# Patient Record
Sex: FEMALE | Race: WHITE | Employment: OTHER | URBAN - METROPOLITAN AREA
[De-identification: names, ages, dates, MRNs, and addresses within clinical notes are randomized per-mention and may not be internally consistent; named-entity substitution may affect disease eponyms.]

---

## 2017-04-08 ENCOUNTER — HOSPITAL ENCOUNTER (OUTPATIENT)
Dept: MRI IMAGING | Age: 62
Discharge: HOME | End: 2017-04-08
Payer: MEDICARE

## 2017-04-08 VITALS — WEIGHT: 254 LBS

## 2017-04-08 DIAGNOSIS — M48.02 CERVICAL SPINAL STENOSIS: ICD-10-CM

## 2017-04-08 PROCEDURE — 72141 MRI NECK SPINE W/O DYE: CPT

## 2017-10-23 ENCOUNTER — HOSPITAL ENCOUNTER (OUTPATIENT)
Dept: PREADMISSION TESTING | Age: 62
Discharge: HOME | End: 2017-10-23
Payer: MEDICARE

## 2017-10-23 VITALS
RESPIRATION RATE: 16 BRPM | SYSTOLIC BLOOD PRESSURE: 194 MMHG | HEART RATE: 86 BPM | WEIGHT: 258.13 LBS | OXYGEN SATURATION: 95 % | BODY MASS INDEX: 41.49 KG/M2 | DIASTOLIC BLOOD PRESSURE: 60 MMHG | HEIGHT: 66 IN | TEMPERATURE: 98 F

## 2017-10-23 DIAGNOSIS — R01.1 HEART MURMUR: Chronic | ICD-10-CM

## 2017-10-23 DIAGNOSIS — K44.9 HIATAL HERNIA: ICD-10-CM

## 2017-10-23 DIAGNOSIS — N28.9 KIDNEY DISEASE: Chronic | ICD-10-CM

## 2017-10-23 PROBLEM — I25.10 CORONARY ARTERY DISEASE INVOLVING NATIVE HEART: Status: ACTIVE | Noted: 2017-08-09

## 2017-10-23 PROBLEM — M54.50 LOW BACK PAIN: Status: ACTIVE | Noted: 2017-10-23

## 2017-10-23 PROBLEM — N39.46 MIXED STRESS AND URGE URINARY INCONTINENCE: Status: ACTIVE | Noted: 2017-10-17

## 2017-10-23 PROBLEM — D50.9 IRON DEFICIENCY ANEMIA: Status: ACTIVE | Noted: 2017-06-23

## 2017-10-23 PROBLEM — Z78.9 STATIN INTOLERANCE: Status: ACTIVE | Noted: 2017-10-23

## 2017-10-23 PROBLEM — N32.81 OAB (OVERACTIVE BLADDER): Status: ACTIVE | Noted: 2017-10-17

## 2017-10-23 LAB
ANION GAP SERPL CALCULATED.3IONS-SCNC: 16 MEQ/L (ref 7–13)
BUN BLDV-MCNC: 37 MG/DL (ref 8–23)
CALCIUM SERPL-MCNC: 8.9 MG/DL (ref 8.6–10.2)
CHLORIDE BLD-SCNC: 99 MEQ/L (ref 98–107)
CO2: 22 MEQ/L (ref 22–29)
CREAT SERPL-MCNC: 1.16 MG/DL (ref 0.5–0.9)
EKG ATRIAL RATE: 73 BPM
EKG P AXIS: 43 DEGREES
EKG P-R INTERVAL: 180 MS
EKG Q-T INTERVAL: 390 MS
EKG QRS DURATION: 86 MS
EKG QTC CALCULATION (BAZETT): 429 MS
EKG R AXIS: 5 DEGREES
EKG T AXIS: 43 DEGREES
EKG VENTRICULAR RATE: 73 BPM
GFR AFRICAN AMERICAN: 56.9
GFR NON-AFRICAN AMERICAN: 47
GLUCOSE BLD-MCNC: 252 MG/DL (ref 74–109)
HCT VFR BLD CALC: 30.7 % (ref 37–47)
HEMOGLOBIN: 10 G/DL (ref 12–16)
MCH RBC QN AUTO: 26.5 PG (ref 27–31.3)
MCHC RBC AUTO-ENTMCNC: 32.5 % (ref 33–37)
MCV RBC AUTO: 81.5 FL (ref 82–100)
PDW BLD-RTO: 16.6 % (ref 11.5–14.5)
PLATELET # BLD: 239 K/UL (ref 130–400)
POTASSIUM SERPL-SCNC: 5.2 MEQ/L (ref 3.5–5.1)
RBC # BLD: 3.77 M/UL (ref 4.2–5.4)
SODIUM BLD-SCNC: 137 MEQ/L (ref 132–144)
WBC # BLD: 9.6 K/UL (ref 4.8–10.8)

## 2017-10-23 PROCEDURE — 85027 COMPLETE CBC AUTOMATED: CPT

## 2017-10-23 PROCEDURE — 80048 BASIC METABOLIC PNL TOTAL CA: CPT

## 2017-10-23 PROCEDURE — 93010 ELECTROCARDIOGRAM REPORT: CPT | Performed by: INTERNAL MEDICINE

## 2017-10-23 PROCEDURE — 93005 ELECTROCARDIOGRAM TRACING: CPT

## 2017-10-23 RX ORDER — DULOXETIN HYDROCHLORIDE 60 MG/1
60 CAPSULE, DELAYED RELEASE ORAL DAILY
COMMUNITY
Start: 2017-09-01

## 2017-10-23 RX ORDER — CHLORTHALIDONE 25 MG/1
25 TABLET ORAL
COMMUNITY
Start: 2017-07-26

## 2017-10-23 RX ORDER — MONTELUKAST SODIUM 10 MG/1
TABLET ORAL
COMMUNITY
Start: 2017-10-04

## 2017-10-23 RX ORDER — IRBESARTAN 300 MG/1
150 TABLET ORAL 2 TIMES DAILY
COMMUNITY
Start: 2017-07-27

## 2017-10-23 RX ORDER — ALBUTEROL SULFATE 90 UG/1
2 AEROSOL, METERED RESPIRATORY (INHALATION) EVERY 6 HOURS PRN
COMMUNITY

## 2017-10-23 RX ORDER — HYDROMORPHONE HYDROCHLORIDE 4 MG/1
4 TABLET ORAL
COMMUNITY
End: 2021-04-23

## 2017-10-23 RX ORDER — LEVOTHYROXINE SODIUM 88 UG/1
88 TABLET ORAL DAILY
COMMUNITY
Start: 2013-09-12

## 2017-10-23 RX ORDER — NEBIVOLOL 20 MG/1
20 TABLET ORAL
COMMUNITY
Start: 2017-07-26 | End: 2021-11-01

## 2017-10-23 RX ORDER — DULOXETIN HYDROCHLORIDE 20 MG/1
20 CAPSULE, DELAYED RELEASE ORAL
COMMUNITY
Start: 2017-09-01 | End: 2021-04-23

## 2017-10-23 RX ORDER — DOXAZOSIN MESYLATE 1 MG/1
2 TABLET ORAL
COMMUNITY
Start: 2017-03-24 | End: 2020-10-23 | Stop reason: DRUGHIGH

## 2017-10-23 RX ORDER — CODEINE/BUTALBITAL/ASA/CAFFEIN 30-50-325
1 CAPSULE ORAL
COMMUNITY
End: 2021-11-01

## 2017-10-23 RX ORDER — CARISOPRODOL 350 MG/1
350 TABLET ORAL
COMMUNITY
End: 2021-11-01 | Stop reason: ALTCHOICE

## 2017-10-23 RX ORDER — NITROGLYCERIN 0.3 MG/1
TABLET SUBLINGUAL
COMMUNITY
Start: 2017-02-24

## 2017-10-27 ENCOUNTER — ANESTHESIA EVENT (OUTPATIENT)
Dept: OPERATING ROOM | Age: 62
End: 2017-10-27
Payer: MEDICARE

## 2017-10-27 RX ORDER — SODIUM CHLORIDE 0.9 % (FLUSH) 0.9 %
10 SYRINGE (ML) INJECTION EVERY 12 HOURS SCHEDULED
Status: CANCELLED | OUTPATIENT
Start: 2017-10-27

## 2017-10-27 RX ORDER — SODIUM CHLORIDE 0.9 % (FLUSH) 0.9 %
10 SYRINGE (ML) INJECTION PRN
Status: CANCELLED | OUTPATIENT
Start: 2017-10-27

## 2017-10-30 ENCOUNTER — APPOINTMENT (OUTPATIENT)
Dept: GENERAL RADIOLOGY | Age: 62
End: 2017-10-30
Attending: SPECIALIST
Payer: MEDICARE

## 2017-10-30 ENCOUNTER — ANESTHESIA (OUTPATIENT)
Dept: OPERATING ROOM | Age: 62
End: 2017-10-30
Payer: MEDICARE

## 2017-10-30 ENCOUNTER — HOSPITAL ENCOUNTER (OUTPATIENT)
Age: 62
Setting detail: OUTPATIENT SURGERY
Discharge: HOME | End: 2017-10-30
Attending: SPECIALIST | Admitting: SPECIALIST
Payer: MEDICARE

## 2017-10-30 VITALS — SYSTOLIC BLOOD PRESSURE: 166 MMHG | TEMPERATURE: 96.8 F | OXYGEN SATURATION: 100 % | DIASTOLIC BLOOD PRESSURE: 70 MMHG

## 2017-10-30 VITALS
SYSTOLIC BLOOD PRESSURE: 164 MMHG | OXYGEN SATURATION: 95 % | RESPIRATION RATE: 16 BRPM | HEART RATE: 59 BPM | DIASTOLIC BLOOD PRESSURE: 70 MMHG | TEMPERATURE: 97.6 F

## 2017-10-30 LAB
GLUCOSE BLD-MCNC: 135 MG/DL (ref 60–115)
GLUCOSE BLD-MCNC: 163 MG/DL (ref 60–115)
PERFORMED ON: ABNORMAL
PERFORMED ON: ABNORMAL

## 2017-10-30 PROCEDURE — 6360000002 HC RX W HCPCS: Performed by: SPECIALIST

## 2017-10-30 PROCEDURE — 7100000001 HC PACU RECOVERY - ADDTL 15 MIN: Performed by: SPECIALIST

## 2017-10-30 PROCEDURE — A6258 TRANSPARENT FILM >16<=48 IN: HCPCS | Performed by: SPECIALIST

## 2017-10-30 PROCEDURE — 7100000000 HC PACU RECOVERY - FIRST 15 MIN: Performed by: SPECIALIST

## 2017-10-30 PROCEDURE — 3600000014 HC SURGERY LEVEL 4 ADDTL 15MIN: Performed by: SPECIALIST

## 2017-10-30 PROCEDURE — 2580000003 HC RX 258: Performed by: STUDENT IN AN ORGANIZED HEALTH CARE EDUCATION/TRAINING PROGRAM

## 2017-10-30 PROCEDURE — C1767 GENERATOR, NEURO NON-RECHARG: HCPCS | Performed by: SPECIALIST

## 2017-10-30 PROCEDURE — 3700000001 HC ADD 15 MINUTES (ANESTHESIA): Performed by: SPECIALIST

## 2017-10-30 PROCEDURE — 2580000003 HC RX 258: Performed by: SPECIALIST

## 2017-10-30 PROCEDURE — C1778 LEAD, NEUROSTIMULATOR: HCPCS | Performed by: SPECIALIST

## 2017-10-30 PROCEDURE — 3600000004 HC SURGERY LEVEL 4 BASE: Performed by: SPECIALIST

## 2017-10-30 PROCEDURE — 3700000000 HC ANESTHESIA ATTENDED CARE: Performed by: SPECIALIST

## 2017-10-30 PROCEDURE — 7100000010 HC PHASE II RECOVERY - FIRST 15 MIN: Performed by: SPECIALIST

## 2017-10-30 PROCEDURE — 2500000003 HC RX 250 WO HCPCS: Performed by: SPECIALIST

## 2017-10-30 PROCEDURE — 3209999900 FLUORO FOR SURGICAL PROCEDURES

## 2017-10-30 PROCEDURE — 6360000002 HC RX W HCPCS: Performed by: NURSE ANESTHETIST, CERTIFIED REGISTERED

## 2017-10-30 PROCEDURE — 7100000011 HC PHASE II RECOVERY - ADDTL 15 MIN: Performed by: SPECIALIST

## 2017-10-30 PROCEDURE — 2500000003 HC RX 250 WO HCPCS: Performed by: STUDENT IN AN ORGANIZED HEALTH CARE EDUCATION/TRAINING PROGRAM

## 2017-10-30 PROCEDURE — C1713 ANCHOR/SCREW BN/BN,TIS/BN: HCPCS | Performed by: SPECIALIST

## 2017-10-30 DEVICE — IMPLANTABLE DEVICE: Type: IMPLANTABLE DEVICE | Status: FUNCTIONAL

## 2017-10-30 DEVICE — GENERATOR NEUROSTIMULATOR 304CUCM H219XL195IN THK053IN: Type: IMPLANTABLE DEVICE | Status: FUNCTIONAL

## 2017-10-30 DEVICE — ANCHOR SUT FOR SPNL CRD STIM SWIFT LOCK: Type: IMPLANTABLE DEVICE | Status: FUNCTIONAL

## 2017-10-30 DEVICE — Z DUP USE 2355936 LEAD NEUROSTIMULATOR L60CM 8 ELECTRD KT OCTRODE: Type: IMPLANTABLE DEVICE | Status: FUNCTIONAL

## 2017-10-30 RX ORDER — SODIUM CHLORIDE, SODIUM LACTATE, POTASSIUM CHLORIDE, CALCIUM CHLORIDE 600; 310; 30; 20 MG/100ML; MG/100ML; MG/100ML; MG/100ML
INJECTION, SOLUTION INTRAVENOUS CONTINUOUS
Status: DISCONTINUED | OUTPATIENT
Start: 2017-10-30 | End: 2017-10-30 | Stop reason: HOSPADM

## 2017-10-30 RX ORDER — MEPERIDINE HYDROCHLORIDE 25 MG/ML
12.5 INJECTION INTRAMUSCULAR; INTRAVENOUS; SUBCUTANEOUS EVERY 5 MIN PRN
Status: DISCONTINUED | OUTPATIENT
Start: 2017-10-30 | End: 2017-10-30 | Stop reason: HOSPADM

## 2017-10-30 RX ORDER — SODIUM CHLORIDE 0.9 % (FLUSH) 0.9 %
10 SYRINGE (ML) INJECTION EVERY 12 HOURS SCHEDULED
Status: DISCONTINUED | OUTPATIENT
Start: 2017-10-30 | End: 2017-10-30 | Stop reason: HOSPADM

## 2017-10-30 RX ORDER — METOCLOPRAMIDE HYDROCHLORIDE 5 MG/ML
10 INJECTION INTRAMUSCULAR; INTRAVENOUS
Status: DISCONTINUED | OUTPATIENT
Start: 2017-10-30 | End: 2017-10-30 | Stop reason: HOSPADM

## 2017-10-30 RX ORDER — PROPOFOL 10 MG/ML
INJECTION, EMULSION INTRAVENOUS PRN
Status: DISCONTINUED | OUTPATIENT
Start: 2017-10-30 | End: 2017-10-30 | Stop reason: SDUPTHER

## 2017-10-30 RX ORDER — DIPHENHYDRAMINE HYDROCHLORIDE 50 MG/ML
12.5 INJECTION INTRAMUSCULAR; INTRAVENOUS
Status: DISCONTINUED | OUTPATIENT
Start: 2017-10-30 | End: 2017-10-30 | Stop reason: HOSPADM

## 2017-10-30 RX ORDER — ONDANSETRON 2 MG/ML
4 INJECTION INTRAMUSCULAR; INTRAVENOUS
Status: DISCONTINUED | OUTPATIENT
Start: 2017-10-30 | End: 2017-10-30 | Stop reason: HOSPADM

## 2017-10-30 RX ORDER — HYDROCODONE BITARTRATE AND ACETAMINOPHEN 5; 325 MG/1; MG/1
1 TABLET ORAL PRN
Status: DISCONTINUED | OUTPATIENT
Start: 2017-10-30 | End: 2017-10-30 | Stop reason: HOSPADM

## 2017-10-30 RX ORDER — FENTANYL CITRATE 50 UG/ML
50 INJECTION, SOLUTION INTRAMUSCULAR; INTRAVENOUS EVERY 10 MIN PRN
Status: DISCONTINUED | OUTPATIENT
Start: 2017-10-30 | End: 2017-10-30 | Stop reason: HOSPADM

## 2017-10-30 RX ORDER — PROPOFOL 10 MG/ML
INJECTION, EMULSION INTRAVENOUS CONTINUOUS PRN
Status: DISCONTINUED | OUTPATIENT
Start: 2017-10-30 | End: 2017-10-30 | Stop reason: SDUPTHER

## 2017-10-30 RX ORDER — MAGNESIUM HYDROXIDE 1200 MG/15ML
LIQUID ORAL CONTINUOUS PRN
Status: DISCONTINUED | OUTPATIENT
Start: 2017-10-30 | End: 2017-10-30 | Stop reason: HOSPADM

## 2017-10-30 RX ORDER — SODIUM CHLORIDE 0.9 % (FLUSH) 0.9 %
10 SYRINGE (ML) INJECTION PRN
Status: DISCONTINUED | OUTPATIENT
Start: 2017-10-30 | End: 2017-10-30 | Stop reason: HOSPADM

## 2017-10-30 RX ORDER — ONDANSETRON 2 MG/ML
4 INJECTION INTRAMUSCULAR; INTRAVENOUS EVERY 6 HOURS PRN
Status: DISCONTINUED | OUTPATIENT
Start: 2017-10-30 | End: 2017-10-30 | Stop reason: HOSPADM

## 2017-10-30 RX ORDER — BUPIVACAINE HYDROCHLORIDE 5 MG/ML
INJECTION, SOLUTION EPIDURAL; INTRACAUDAL PRN
Status: DISCONTINUED | OUTPATIENT
Start: 2017-10-30 | End: 2017-10-30 | Stop reason: HOSPADM

## 2017-10-30 RX ORDER — LIDOCAINE HYDROCHLORIDE 10 MG/ML
1 INJECTION, SOLUTION EPIDURAL; INFILTRATION; INTRACAUDAL; PERINEURAL
Status: COMPLETED | OUTPATIENT
Start: 2017-10-30 | End: 2017-10-30

## 2017-10-30 RX ORDER — HYDROCODONE BITARTRATE AND ACETAMINOPHEN 5; 325 MG/1; MG/1
2 TABLET ORAL PRN
Status: DISCONTINUED | OUTPATIENT
Start: 2017-10-30 | End: 2017-10-30 | Stop reason: HOSPADM

## 2017-10-30 RX ORDER — LIDOCAINE HYDROCHLORIDE AND EPINEPHRINE 10; 10 MG/ML; UG/ML
INJECTION, SOLUTION INFILTRATION; PERINEURAL PRN
Status: DISCONTINUED | OUTPATIENT
Start: 2017-10-30 | End: 2017-10-30 | Stop reason: HOSPADM

## 2017-10-30 RX ORDER — LIDOCAINE HYDROCHLORIDE 10 MG/ML
1 INJECTION, SOLUTION EPIDURAL; INFILTRATION; INTRACAUDAL; PERINEURAL
Status: DISCONTINUED | OUTPATIENT
Start: 2017-10-30 | End: 2017-10-30 | Stop reason: HOSPADM

## 2017-10-30 RX ADMIN — PROPOFOL 30 MG: 10 INJECTION, EMULSION INTRAVENOUS at 08:42

## 2017-10-30 RX ADMIN — LIDOCAINE HYDROCHLORIDE 0.1 ML: 10 INJECTION, SOLUTION EPIDURAL; INFILTRATION; INTRACAUDAL; PERINEURAL at 06:15

## 2017-10-30 RX ADMIN — PROPOFOL 10 MG: 10 INJECTION, EMULSION INTRAVENOUS at 09:38

## 2017-10-30 RX ADMIN — PROPOFOL 30 MG: 10 INJECTION, EMULSION INTRAVENOUS at 08:04

## 2017-10-30 RX ADMIN — PROPOFOL 20 MG: 10 INJECTION, EMULSION INTRAVENOUS at 08:15

## 2017-10-30 RX ADMIN — PROPOFOL 140 MCG/KG/MIN: 10 INJECTION, EMULSION INTRAVENOUS at 08:03

## 2017-10-30 RX ADMIN — VANCOMYCIN HYDROCHLORIDE 1000 MG: 1 INJECTION, POWDER, LYOPHILIZED, FOR SOLUTION INTRAVENOUS at 06:15

## 2017-10-30 RX ADMIN — SODIUM CHLORIDE, POTASSIUM CHLORIDE, SODIUM LACTATE AND CALCIUM CHLORIDE: 600; 310; 30; 20 INJECTION, SOLUTION INTRAVENOUS at 08:54

## 2017-10-30 RX ADMIN — PROPOFOL 10 MG: 10 INJECTION, EMULSION INTRAVENOUS at 09:37

## 2017-10-30 RX ADMIN — SODIUM CHLORIDE, POTASSIUM CHLORIDE, SODIUM LACTATE AND CALCIUM CHLORIDE: 600; 310; 30; 20 INJECTION, SOLUTION INTRAVENOUS at 06:15

## 2017-10-30 RX ADMIN — PROPOFOL 30 MG: 10 INJECTION, EMULSION INTRAVENOUS at 08:03

## 2017-10-30 ASSESSMENT — PAIN - FUNCTIONAL ASSESSMENT: PAIN_FUNCTIONAL_ASSESSMENT: 0-10

## 2017-10-30 NOTE — BRIEF OP NOTE
Brief Postoperative Note  ______________________________________________________________    Patient: Megan Best  YOB: 1955  MRN: [id number]  Date of Procedure: 10/30/2017    Pre-Op Diagnosis: SPINAL STENOSIS THORACIC RADICULOPATHY    Post-Op Diagnosis: Same       Procedure(s):  SPINAL CORD STIMULATOR IMPLANT (ST OTIS)    Anesthesia: Monitor Anesthesia Care    Surgeon(s):  Kylee Hall MD    Staff:  First Assistant: Geri Hearing  Scrub Person First: Tiarra Mendoza     Estimated Blood Loss: 25 (units unknown)    Complications: None    Specimens:   * No specimens in log *    Implants:    Implant Name Type Inv.  Item Serial No.  Lot No. LRB No. Used   LEAD OCTRODE KT 60CM - [id number] Spine LEAD OCTRODE KT 60CM [id number] [address] [place] [address]  N/A 1   LEAD OCTRODE KT 90CM - [id number] Spine LEAD OCTRODE KT 90CM [id number] [address] [place] [address]  N/A 1   [employer] lock   [id number]  [id number] N/A 2   IMPL SPINE STIMULATOR PROCLAIM 5 [employer]ITE - [id number] Spine:Stimulator IMPL SPINE STIMULATOR PROCLAIM 5 [employer] [id number] [address] [place] [address]   N/A 1         Drains:  none    Findings: see op note    Kylee Hall MD  Date: 10/30/2017  Time: 9:50 AM

## 2017-10-30 NOTE — OP NOTE
[address]                     [address], [address] [facility]                               OPERATIVE REPORT    PATIENT NAME: Sam Hawkins                  :             1955  MED REC NO:   [id number]                           ROOM:  ACCOUNT NO:   [de-identified]                          ADMISSION DATE:  10/30/2017  PROVIDER:     Gaston Abel MD    DATE OF PROCEDURE:  10/30/2017    PROCEDURE:  Implantation of dual spinal cord stimulator lead with a  generator, non-chargeable, reprogrammable, company [employer]. PREOPERATIVE DIAGNOSES:  Status post laminectomy syndrome, lumbar  radiculopathy. POSTOPERATIVE DIAGNOSES:  Status post laminectomy syndrome, lumbar  radiculopathy. ANESTHESIA:  MAC. SURGEON:  Juan Luis Pickering MD.    OPERATIVE PROCEDURE:  The patient was brought to the operating room. She was in prone position. All the pressure points were properly  secured. Anesthesia was started. Back was sterilely cleaned and  draped with aseptic technique. With fluoroscopy, AP and lateral view  of lumbar spine was obtained and decision was made to enter L1-L2  epidural space. Skin was made numb to the left of midline at about  L2-L3 level with 1% lidocaine containing 1:200,000 epinephrine with  0.5% Marcaine mixed in equal amount. Then, Tuohy needle that was  provided in the kit was then brought to the field, was inserted, and  was approached and advanced under fluoroscopic guidance to reach L1-L2  epidural space. This was to the left of midline. Having the needle  in proper position, stylet was taken out, lead was inserted and was  threaded to reach the top of T8 level. In a similar way, the second  needle was inserted to the right of midline and the stylet was taken  out. Lead was inserted and was advanced to reach the top of T level  to the right of midline.   Now, another local anesthetic was injected  in the midline, incision was made, and dissection was done to reach  the supraspinous fascia. Both needles were found. All the bleeding  points were cauterized. Then the patient was awakened, stimulation  was carried out. The patient had excellent vibration in all the area  of the _____. Therefore, it was thought satisfactory and both leads  were then secured with the anchor that was provided by Janelle Rogers and  they were locked and were secured to supraspinous fascia with 2-0  silk. Our attention was given to the left of midline over the hip  area where local anesthetic was injected. An incision was made and  dissection was done to create a pocket. All the bleeding points were  cauterized. Now, tunnel was created from pocket through the back and  both leads were threaded through. One lead was 60 cm long and the  right of midline was 90 cm long. We left lot of curves at the back  area and then brought both leads to the pocket, then it was connected  to the battery and screws were tightened. All the impedances were  checked, they were within normal limits. Battery was put back in the  pocket, secured to the fascia with 2-0 silk. All the wounds were  closed with 3-0 Vicryl, 4-0 Vicryl. Because she is allergic to a lot  of things, simple dressings were applied. The patient was awakened,  taken to the recovery room, extensive programming was done, and was  then discharged in satisfactory condition.         June Hawkins MD    D: 10/30/2017 10:12:37       T: 10/30/2017 13:05:38     [email or web address]  Job#: [id number]     Doc#: [id number]

## 2017-10-30 NOTE — PROGRESS NOTES
Ready for disch. Instr have been reviewed. Patient and son verbalize understanding. [employer] rep to talk with patient prior to disch. Called into OR; unable to come to bedside at this time. Patient and son updated.

## 2017-10-30 NOTE — ANESTHESIA PRE PROCEDURE
Department of Anesthesiology  Preprocedure Note       Name:  Guilherme Records   Age:  57 y.o.  :  1955                                          MRN:  [id number]         Date:  10/30/2017      Surgeon: Selina Ceballos):  Yoni Childs MD    Procedure: Procedure(s):  SPINAL CORD STIMULATOR IMPLANT (James Haile)    Medications prior to admission:   Prior to Admission medications    Medication Sig Start Date End Date Taking? Authorizing Provider   carisoprodol (SOMA) 350 MG tablet Take 350 mg by mouth   Yes Historical Provider, MD   nebivolol (BYSTOLIC) 20 MG TABS tablet Take 20 mg by mouth 17  Yes Historical Provider, MD   montelukast (SINGULAIR) 10 MG tablet TAKE 1 TABLET BY MOUTH DAILY AT BEDTIME 10/4/17  Yes Historical Provider, MD   linagliptin (TRADJENTA) 5 MG tablet Take by mouth   Yes Historical Provider, MD   levothyroxine (SYNTHROID) 88 MCG tablet Take 88 mcg by mouth 13  Yes Historical Provider, MD   irbesartan (AVAPRO) 300 MG tablet Take 150 mg by mouth 17  Yes Historical Provider, MD   DULoxetine (CYMBALTA) 20 MG extended release capsule Take 20 mg by mouth 17  Yes Historical Provider, MD   DULoxetine (CYMBALTA) 60 MG extended release capsule Take 60 mg by mouth 17  Yes Historical Provider, MD   HYDROmorphone (EXALGO) 8 MG T24A extended release tablet Take 8 mg by mouth . Yes Historical Provider, MD   insulin regular human (HUMULIN R U-500) 500 UNIT/ML concentrated injection vial 24 units in am, 21 units in pm 17  Yes Historical Provider, MD   doxazosin (CARDURA) 1 MG tablet Take 2 mg by mouth 3/24/17  Yes Historical Provider, MD   HYDROmorphone (DILAUDID) 4 MG tablet Take 4 mg by mouth .    Yes Historical Provider, MD   chlorthalidone (HYGROTON) 25 MG tablet Take 25 mg by mouth 17  Yes Historical Provider, MD   albuterol sulfate  (90 Base) MCG/ACT inhaler Inhale 2 puffs into the lungs every 6 hours as needed for Wheezing   Yes Historical Provider, MD butalbital-aspirin-caffeine-codeine (FIORINAL WITH CODEINE) -47-30 MG capsule Take 1 capsule by mouth    Historical Provider, MD   nitroGLYCERIN (NITROSTAT) 0.3 MG SL tablet DISOLVE 1 TABLET UNDER THE TONGUE AS NEEDED FOR CHEST PAIN. IF NO REFLIEF CALL 911 2/24/17   Historical Provider, MD   Evolocumab 140 MG/ML SOAJ Inject 140 mg into the skin 10/6/17   Historical Provider, MD   diclofenac sodium 1 % GEL apply 2gms to areas of joint pain up to four times a day. Avoid contact with eyes.  Do not exceed 32gm/day 2/23/16   Historical Provider, MD       Current medications:    Current Facility-Administered Medications   Medication Dose Route Frequency Provider Last Rate Last Dose    lactated ringers infusion   Intravenous Continuous Ova Pollen Lin,  mL/hr at 10/30/17 0615      vancomycin 1000 mg IVPB in 250 mL D5W addavial  1,000 mg Intravenous Once Yoni Childs  mL/hr at 10/30/17 0615 1,000 mg at 10/30/17 0615    fentaNYL (SUBLIMAZE) injection 50 mcg  50 mcg Intravenous Q10 Min PRN Deysi Flores MD        HYDROmorphone (DILAUDID) injection 0.5 mg  0.5 mg Intravenous Q10 Min PRN Deysi Flores MD        HYDROcodone-acetaminophen [facility]) 5-325 MG per tablet 1 tablet  1 tablet Oral PRN Deysi Flores MD        Or    HYDROcodone-acetaminophen [facility]) 5-325 MG per tablet 2 tablet  2 tablet Oral PRN Deysi Flores MD        diphenhydrAMINE (BENADRYL) injection 12.5 mg  12.5 mg Intravenous Once PRN Deysi Flores MD        ondansetron [facility]) injection 4 mg  4 mg Intravenous Once PRN Deysi Flores MD        metoclopramide [facility]) injection 10 mg  10 mg Intravenous Once PRN Deysi Flores MD        meperidine (DEMEROL) injection 12.5 mg  12.5 mg Intravenous Q5 Min PRN Deysi Flores MD        lactated ringers infusion   Intravenous Continuous Deysi Flores MD        sodium chloride flush 0.9 % injection 10 mL  10 stenosis M48.061    Major depressive disorder, single episode, mild (HCC) F32.0    Mixed hyperlipidemia E78.2    Mixed incontinence urge and stress (male)(female) N39.46    Mixed stress and urge urinary incontinence N39.46    Neck mass R22.1    OAB (overactive bladder) N32.81    Pain in joint, multiple sites M25.50    Renal artery stenosis (HCC) I70.1    Renovascular hypertension I15.0    Statin intolerance Z78.9    Subclavian arterial stenosis ([facility]) I77.1    Type 2 diabetes mellitus with moderate nonproliferative diabetic retinopathy and without macular edema ([facility]) D08.8861    Type 2 diabetes mellitus with diabetic neuropathy ([facility]) E11.40    Type 2 diabetes mellitus with moderate nonproliferative diabetic retinopathy with macular edema E11.3319    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    Vitamin D deficiency E55.9    Low back pain M54.5    Heart murmur R01.1    Hiatal hernia K44.9    Kidney disease N28.9       Past Medical History:        Diagnosis Date    Diabetes mellitus ([facility] [address]     hx since 10/1992    Hiatal hernia     Hyperlipidemia     past meds / now on Repatha    Hypertension     meds > 74 yrs / denies TIA or stroke    Inflammatory arthritis 11/20/2012    AMANDEEP (obstructive sleep apnea)     DOES NOT USE CPAP    PVD (peripheral vascular disease) ([facility])     2 stents left thigh    Renal artery occlusion ([facility])     stents of right    Renal insufficiency     Thyroid disease     meds > 4 yrs       Past Surgical History:        Procedure Laterality Date    BACK SURGERY      C 3-4 fusion    BACK SURGERY      L 3-4-5 laminectomy    CARPAL TUNNEL RELEASE Bilateral     CERVICAL SPINE SURGERY      CHOLECYSTECTOMY      COLONOSCOPY      CYSTOCELE REPAIR      ENDOSCOPY, COLON, DIAGNOSTIC      FEMORAL BYPASS       left thigh stent     HYSTERECTOMY      KIDNEY SURGERY      renal stent placement right    LUMBAR SPINE SURGERY      NOSE SURGERY      DNS repair    RECTOCELE

## 2017-10-30 NOTE — ANESTHESIA POSTPROCEDURE EVALUATION
Department of Anesthesiology  Postprocedure Note    Patient: Bijal Hernandez  MRN: [id number]  YOB: 1955  Date of evaluation: 10/30/2017  Time:  9:59 AM     Procedure Summary     Date:  10/30/17 Room / Location:  [facility] OR [address] [place] [address] OR    Anesthesia Start:  0959 Anesthesia Stop:      Procedure:  SPINAL CORD STIMULATOR IMPLANT (ST OTIS) (N/A ) Diagnosis:  (SPINAL STENOSIS THORACIC RADICULOPATHY)    Surgeon:  Dani Diallo MD Responsible Provider:  Tello Ambrosio MD    Anesthesia Type:  MAC ASA Status:  3          Anesthesia Type: MAC    Omar Phase I:      Omar Phase II:      Last vitals: Reviewed and per EMR flowsheets.        Anesthesia Post Evaluation    Patient location during evaluation: PACU  Patient participation: complete - patient participated  Level of consciousness: awake  Pain score: 0  Airway patency: patent  Nausea & Vomiting: no nausea and no vomiting  Complications: no  Cardiovascular status: hemodynamically stable  Respiratory status: acceptable  Hydration status: euvolemic

## 2019-07-08 LAB
ALBUMIN: 4.1 G/DL (ref 3.4–5)
ALP BLD-CCNC: 88 U/L (ref 33–136)
ALT SERPL-CCNC: 13 U/L (ref 7–45)
ANION GAP SERPL CALCULATED.3IONS-SCNC: 16 MMOL/L (ref 10–20)
AST SERPL-CCNC: 10 U/L (ref 9–39)
BICARBONATE: 24 MMOL/L (ref 21–32)
BILIRUB SERPL-MCNC: 0.5 MG/DL (ref 0–1.2)
CALCIUM SERPL-MCNC: 9.2 MG/DL (ref 8.6–10.3)
CHLORIDE BLD-SCNC: 100 MMOL/L (ref 98–107)
CHOLESTEROL/HDL RATIO: 2.3
CHOLESTEROL: 91 MG/DL (ref 0–199)
CREAT SERPL-MCNC: 1.3 MG/DL (ref 0.5–1)
GFR AFRICAN AMERICAN: 50 ML/MIN/1.73M2
GFR NON-AFRICAN AMERICAN: 41 ML/MIN/1.73M2
GLUCOSE: 209 MG/DL (ref 74–99)
HDLC SERPL-MCNC: 40 MG/DL
LDL CHOLESTEROL: 22 MG/DL (ref 0–99)
POTASSIUM SERPL-SCNC: 4.6 MMOL/L (ref 3.5–5.3)
SODIUM BLD-SCNC: 135 MMOL/L (ref 136–145)
TOTAL PROTEIN: 7.3 G/DL (ref 6.4–8.2)
TRIGL SERPL-MCNC: 147 MG/DL (ref 0–149)
UREA NITROGEN: 29 MG/DL (ref 6–23)
VLDLC SERPL CALC-MCNC: 29 MG/DL (ref 0–40)

## 2019-10-18 ENCOUNTER — OFFICE VISIT (OUTPATIENT)
Dept: NEUROLOGY | Age: 64
End: 2019-10-18
Payer: MEDICARE

## 2019-10-18 VITALS
DIASTOLIC BLOOD PRESSURE: 62 MMHG | HEART RATE: 75 BPM | TEMPERATURE: 97.2 F | BODY MASS INDEX: 43.23 KG/M2 | WEIGHT: 269 LBS | SYSTOLIC BLOOD PRESSURE: 172 MMHG | HEIGHT: 66 IN

## 2019-10-18 DIAGNOSIS — M54.2 CERVICALGIA: ICD-10-CM

## 2019-10-18 DIAGNOSIS — G43.709 CHRONIC MIGRAINE WITHOUT AURA WITHOUT STATUS MIGRAINOSUS, NOT INTRACTABLE: ICD-10-CM

## 2019-10-18 DIAGNOSIS — M96.1 FAILED BACK SYNDROME: Primary | ICD-10-CM

## 2019-10-18 PROCEDURE — 99213 OFFICE O/P EST LOW 20 MIN: CPT | Performed by: PSYCHIATRY & NEUROLOGY

## 2019-10-18 RX ORDER — DICYCLOMINE HCL 20 MG
TABLET ORAL
COMMUNITY

## 2019-10-18 RX ORDER — DOXEPIN HYDROCHLORIDE 10 MG/1
CAPSULE ORAL
COMMUNITY
End: 2019-10-18

## 2019-10-18 RX ORDER — FUROSEMIDE 20 MG/1
TABLET ORAL
COMMUNITY
End: 2021-11-01 | Stop reason: ALTCHOICE

## 2019-10-18 RX ORDER — CLOBETASOL PROPIONATE 0.46 MG/ML
SOLUTION TOPICAL
COMMUNITY
End: 2021-11-01

## 2019-10-18 RX ORDER — NALOXONE HYDROCHLORIDE 4 MG/.1ML
SPRAY NASAL
COMMUNITY

## 2019-10-18 RX ORDER — DOXEPIN HYDROCHLORIDE 50 MG/G
CREAM TOPICAL
COMMUNITY
End: 2019-10-18

## 2019-10-18 RX ORDER — HYDROCHLOROTHIAZIDE 12.5 MG/1
CAPSULE, GELATIN COATED ORAL
COMMUNITY
End: 2021-11-01

## 2019-10-18 RX ORDER — CARVEDILOL 25 MG/1
TABLET ORAL
Refills: 3 | COMMUNITY
Start: 2019-08-18

## 2019-10-18 RX ORDER — AMLODIPINE BESYLATE 5 MG/1
TABLET ORAL
COMMUNITY
End: 2019-10-18

## 2019-10-18 RX ORDER — BUTALBITAL, ACETAMINOPHEN AND CAFFEINE 50; 325; 40 MG/1; MG/1; MG/1
TABLET ORAL
COMMUNITY
End: 2019-10-18

## 2019-10-18 RX ORDER — CETIRIZINE HYDROCHLORIDE 10 MG/1
10 TABLET ORAL
COMMUNITY
Start: 2015-06-10

## 2019-10-18 RX ORDER — ALPRAZOLAM 0.5 MG/1
TABLET ORAL
COMMUNITY
End: 2021-11-01

## 2019-10-18 RX ORDER — BACLOFEN 10 MG/1
10 TABLET ORAL 3 TIMES DAILY PRN
COMMUNITY

## 2019-10-18 RX ORDER — BETAMETHASONE DIPROPIONATE 0.5 MG/G
CREAM TOPICAL
COMMUNITY

## 2019-10-18 ASSESSMENT — ENCOUNTER SYMPTOMS
BACK PAIN: 0
SHORTNESS OF BREATH: 0
PHOTOPHOBIA: 0
CHOKING: 0
VOMITING: 0
NAUSEA: 0
TROUBLE SWALLOWING: 0

## 2020-01-08 LAB
ALBUMIN: 4.2 G/DL (ref 3.4–5)
ALP BLD-CCNC: 87 U/L (ref 33–136)
ALT SERPL-CCNC: 10 U/L (ref 7–45)
ANION GAP SERPL CALCULATED.3IONS-SCNC: 17 MMOL/L (ref 10–20)
AST SERPL-CCNC: 9 U/L (ref 9–39)
BICARBONATE: 21 MMOL/L (ref 21–32)
BILIRUB SERPL-MCNC: 0.5 MG/DL (ref 0–1.2)
CALCIUM SERPL-MCNC: 8.8 MG/DL (ref 8.6–10.3)
CHLORIDE BLD-SCNC: 102 MMOL/L (ref 98–107)
CHOLESTEROL/HDL RATIO: 3
CHOLESTEROL: 116 MG/DL (ref 0–199)
CREAT SERPL-MCNC: 1.4 MG/DL (ref 0.5–1)
GFR AFRICAN AMERICAN: 46 ML/MIN/1.73M2
GFR NON-AFRICAN AMERICAN: 38 ML/MIN/1.73M2
GLUCOSE: 262 MG/DL (ref 74–99)
HDLC SERPL-MCNC: 39 MG/DL
LDL CHOLESTEROL: 46 MG/DL (ref 0–99)
POTASSIUM SERPL-SCNC: 4.7 MMOL/L (ref 3.5–5.3)
SODIUM BLD-SCNC: 135 MMOL/L (ref 136–145)
TOTAL PROTEIN: 7.2 G/DL (ref 6.4–8.2)
TRIGL SERPL-MCNC: 155 MG/DL (ref 0–149)
UREA NITROGEN: 43 MG/DL (ref 6–23)
VLDLC SERPL CALC-MCNC: 31 MG/DL (ref 0–40)

## 2020-10-22 PROBLEM — B35.1 ONYCHOMYCOSIS: Status: ACTIVE | Noted: 2020-10-22

## 2020-10-22 PROBLEM — E83.42 HYPOMAGNESEMIA: Status: ACTIVE | Noted: 2018-12-21

## 2020-10-22 PROBLEM — E66.01 OBESITY, CLASS III, BMI 40-49.9 (MORBID OBESITY) (HCC): Status: ACTIVE | Noted: 2018-05-11

## 2020-10-22 PROBLEM — M62.838 MUSCLE SPASMS OF BOTH LOWER EXTREMITIES: Status: ACTIVE | Noted: 2018-04-10

## 2020-10-22 PROBLEM — L03.039 PARONYCHIA OF TOE: Status: ACTIVE | Noted: 2018-08-09

## 2020-10-22 PROBLEM — I65.23 INTERNAL CAROTID ARTERY STENOSIS, BILATERAL: Status: ACTIVE | Noted: 2019-03-01

## 2020-10-22 PROBLEM — D64.9 ANEMIA, UNSPECIFIED: Status: ACTIVE | Noted: 2017-07-13

## 2020-10-22 PROBLEM — N95.2 POSTMENOPAUSAL ATROPHIC VAGINITIS: Status: ACTIVE | Noted: 2017-10-17

## 2020-10-22 PROBLEM — E11.3299 MILD NONPROLIFERATIVE DIABETIC RETINOPATHY ASSOCIATED WITH TYPE 2 DIABETES MELLITUS (HCC): Status: ACTIVE | Noted: 2019-03-15

## 2020-10-22 PROBLEM — L60.0 INGROWING NAIL: Status: ACTIVE | Noted: 2018-04-18

## 2020-10-22 PROBLEM — R09.89 POOR PERIPHERAL CIRCULATION: Status: ACTIVE | Noted: 2018-08-09

## 2020-10-22 PROBLEM — M25.60 JOINT STIFFNESS OF MULTIPLE SITES: Status: ACTIVE | Noted: 2019-01-15

## 2020-10-22 PROBLEM — M79.676 PAIN IN TOE: Status: ACTIVE | Noted: 2020-10-22

## 2020-10-22 PROBLEM — K57.32 DIVERTICULITIS OF SIGMOID COLON: Status: ACTIVE | Noted: 2018-12-20

## 2020-10-22 PROBLEM — G89.29 CHRONIC BILATERAL LOW BACK PAIN WITHOUT SCIATICA: Status: ACTIVE | Noted: 2017-10-23

## 2020-10-23 ENCOUNTER — OFFICE VISIT (OUTPATIENT)
Dept: NEUROLOGY | Age: 65
End: 2020-10-23
Payer: MEDICARE

## 2020-10-23 VITALS
WEIGHT: 276.8 LBS | HEART RATE: 63 BPM | BODY MASS INDEX: 45.36 KG/M2 | SYSTOLIC BLOOD PRESSURE: 126 MMHG | DIASTOLIC BLOOD PRESSURE: 60 MMHG

## 2020-10-23 PROBLEM — M96.1 FAILED NECK SYNDROME: Status: ACTIVE | Noted: 2020-10-23

## 2020-10-23 PROCEDURE — 1123F ACP DISCUSS/DSCN MKR DOCD: CPT | Performed by: PSYCHIATRY & NEUROLOGY

## 2020-10-23 PROCEDURE — 2022F DILAT RTA XM EVC RTNOPTHY: CPT | Performed by: PSYCHIATRY & NEUROLOGY

## 2020-10-23 PROCEDURE — G8400 PT W/DXA NO RESULTS DOC: HCPCS | Performed by: PSYCHIATRY & NEUROLOGY

## 2020-10-23 PROCEDURE — 99214 OFFICE O/P EST MOD 30 MIN: CPT | Performed by: PSYCHIATRY & NEUROLOGY

## 2020-10-23 PROCEDURE — G8417 CALC BMI ABV UP PARAM F/U: HCPCS | Performed by: PSYCHIATRY & NEUROLOGY

## 2020-10-23 PROCEDURE — 3017F COLORECTAL CA SCREEN DOC REV: CPT | Performed by: PSYCHIATRY & NEUROLOGY

## 2020-10-23 PROCEDURE — 3046F HEMOGLOBIN A1C LEVEL >9.0%: CPT | Performed by: PSYCHIATRY & NEUROLOGY

## 2020-10-23 PROCEDURE — G8484 FLU IMMUNIZE NO ADMIN: HCPCS | Performed by: PSYCHIATRY & NEUROLOGY

## 2020-10-23 PROCEDURE — 1036F TOBACCO NON-USER: CPT | Performed by: PSYCHIATRY & NEUROLOGY

## 2020-10-23 PROCEDURE — 4040F PNEUMOC VAC/ADMIN/RCVD: CPT | Performed by: PSYCHIATRY & NEUROLOGY

## 2020-10-23 PROCEDURE — 1090F PRES/ABSN URINE INCON ASSESS: CPT | Performed by: PSYCHIATRY & NEUROLOGY

## 2020-10-23 PROCEDURE — G8427 DOCREV CUR MEDS BY ELIG CLIN: HCPCS | Performed by: PSYCHIATRY & NEUROLOGY

## 2020-10-23 RX ORDER — FEBUXOSTAT 40 MG/1
TABLET, FILM COATED ORAL
COMMUNITY
Start: 2020-09-24 | End: 2021-11-01

## 2020-10-23 RX ORDER — DOXAZOSIN 2 MG/1
2 TABLET ORAL 2 TIMES DAILY
COMMUNITY

## 2020-10-23 RX ORDER — CLONIDINE HYDROCHLORIDE 0.1 MG/1
0.1 TABLET ORAL PRN
COMMUNITY
Start: 2020-06-23

## 2020-10-23 RX ORDER — PERPHENAZINE 16 MG/1
TABLET, FILM COATED ORAL
COMMUNITY
Start: 2020-09-16

## 2020-10-23 RX ORDER — NALTREXONE HYDROCHLORIDE 50 MG/1
4.5 TABLET, FILM COATED ORAL DAILY
COMMUNITY
Start: 2020-02-01 | End: 2021-04-23

## 2020-10-23 RX ORDER — DIAZEPAM 5 MG/1
TABLET ORAL
Qty: 2 TABLET | Refills: 0 | Status: SHIPPED | OUTPATIENT
Start: 2020-10-23 | End: 2020-11-23

## 2020-10-23 RX ORDER — CLOPIDOGREL BISULFATE 75 MG/1
TABLET ORAL
COMMUNITY
Start: 2020-07-23

## 2020-10-23 ASSESSMENT — ENCOUNTER SYMPTOMS
PHOTOPHOBIA: 0
TROUBLE SWALLOWING: 0
VOMITING: 0
BACK PAIN: 0
COLOR CHANGE: 0
CHOKING: 0
SHORTNESS OF BREATH: 0
NAUSEA: 0

## 2020-10-23 NOTE — PROGRESS NOTES
Subjective:      Patient ID: Timoteo Olivarez is a 77 y.o. female who presents today for:  Chief Complaint   Patient presents with    Follow-up     Pt states that the neuropathy is getting worse. She states just from sitting they feel like they are falling asleep. Pt states that her neck pain is gettig worse. HPI 57-year-old right-handed female with history of intractable peripheral neuropathy with failed back and failed neck syndrome. Patient has considerable pain we have continued her on a high-dose of Cymbalta. She also has headaches which have responded to Cymbalta. She has considerable spine pathology follows up with pain management patient has chronic migraines of this are not bothersome at this time. She is having more problems with her legs only when she lays down and this is a red flag for lumbar pathology. She has a spinal stimulator and therefore she has not had lumbar spine MRI for many years CT scans are not good enough. Patient has not tolerated increasing her Cymbalta or Lyrica for the pain as she gets headaches from the same patient is on polypharmacy.     Past Medical History:   Diagnosis Date    Diabetes mellitus ([address]     hx since 10/1992    Hiatal hernia     Hyperlipidemia     past meds / now on Repatha    Hypertension     meds > 66 yrs / denies TIA or stroke    Inflammatory arthritis 11/20/2012    AMANDEEP (obstructive sleep apnea)     DOES NOT USE CPAP    PVD (peripheral vascular disease) ([address]     2 stents left thigh    Renal artery occlusion (HCC)     stents of right    Renal insufficiency     Thyroid disease     meds > 4 yrs     Past Surgical History:   Procedure Laterality Date    BACK SURGERY      C 3-4 fusion    BACK SURGERY      L 3-4-5 laminectomy    CARPAL TUNNEL RELEASE Bilateral     CERVICAL SPINE SURGERY      CHOLECYSTECTOMY      COLONOSCOPY      CYSTOCELE REPAIR      ENDOSCOPY, COLON, DIAGNOSTIC      FEMORAL BYPASS       left thigh stent     HYSTERECTOMY      IMPLANTATION VAGAL NERVE STIMULATOR N/A 10/30/2017    SPINAL CORD STIMULATOR IMPLANT ([facility]) performed by Pamela Ansari MD at [facility]      renal stent placement right    LUMBAR SPINE SURGERY      NOSE SURGERY      DNS repair    RECTOCELE REPAIR      TONSILLECTOMY AND ADENOIDECTOMY       Social History     Socioeconomic History    Marital status: Single     Spouse name: Not on file    Number of children: Not on file    Years of education: Not on file    Highest education level: Not on file   Occupational History    Not on file   Social Needs    Financial resource strain: Not on file    Food insecurity     Worry: Not on file     Inability: Not on file    Transportation needs     Medical: Not on file     Non-medical: Not on file   Tobacco Use    Smoking status: Former Smoker     Packs/day: 1.00     Years: 45.00     Pack years: 45.00     Types: Cigarettes     Last attempt to quit: 5/23/2010     Years since quitting: 10.4    Smokeless tobacco: Never Used   Substance and Sexual Activity    Alcohol use: No    Drug use: No    Sexual activity: Not on file   Lifestyle    Physical activity     Days per week: Not on file     Minutes per session: Not on file    Stress: Not on file   Relationships    Social connections     Talks on phone: Not on file     Gets together: Not on file     Attends Latter-day service: Not on file     Active member of club or organization: Not on file     Attends meetings of clubs or organizations: Not on file     Relationship status: Not on file    Intimate partner violence     Fear of current or ex partner: Not on file     Emotionally abused: Not on file     Physically abused: Not on file     Forced sexual activity: Not on file   Other Topics Concern    Not on file   Social History Narrative    Not on file     Family History   Problem Relation Age of Onset    High Blood Pressure Mother     High Cholesterol Mother     Cancer Mother    [facility] Mother         lung    Other Mother         PVD    Heart Disease Father     Cancer Father         bladder    Glaucoma Sister     High Blood Pressure Sister     High Cholesterol Sister     Alcohol Abuse Brother      Allergies   Allergen Reactions    Aspirin Swelling    Sulfa Antibiotics Hives     Bactrim    Ace Inhibitors      vasotec    Atenolol     Benzodiazepines      xanax    Calcium Channel Blockers     Cephalosporins Hives     Ceclor  Skin test negative to PCN 3/21/16    Ciprofloxacin Swelling    Citalopram      celexa    Desipramine      Other reaction(s):  Other: See Comments  Headache     Diltiazem     Estrogens      estrace    Flonase [Fluticasone Propionate]     Gabapentin     Hydralazine     Insulin Isophane      bloating    Levofloxacin     Macrolides And Ketolides     Maprotiline      remeron    Niacin And Related      niaspan    Nitroglycerin     Nortriptyline      Other reaction(s): Unknown  Headache     Norvasc [Amlodipine Besylate]     Nsaids     Penicillins     Rosiglitazone     Statins     Sulfonylureas      amaryl, glucotrol    Tetracyclines & Related     Tolterodine      detrol    Tramadol     Tylenol [Acetaminophen]     Valproic Acid And Related     Venlafaxine      effexor    Verapamil     Vit D-Vit E-Safflower Oil     Adhesive Tape Rash    Glyburide Rash    Hydroxychloroquine Hives and Rash    Sulfamethoxazole-Trimethoprim Itching and Rash       Current Outpatient Medications   Medication Sig Dispense Refill    cloNIDine (CATAPRES) 0.1 MG tablet clonidine HCl 0.1 mg tablet      clopidogrel (PLAVIX) 75 MG tablet Take by mouth      febuxostat (ULORIC) 40 MG TABS tablet febuxostat 40 mg tablet      CONTOUR NEXT TEST strip USE 4 STRIPS DAILY      naltrexone (DEPADE) 50 MG tablet Take 4.5 mg by mouth daily      VORTIoxetine (TRINTELLIX) 5 MG tablet Take by mouth      doxazosin (CARDURA) 2 MG tablet doxazosin 2 mg tablet      baclofen (LIORESAL) 10 MG tablet baclofen 10 mg tablet      carvedilol (COREG) 25 MG tablet TK 1 T PO BID  3    cetirizine (ZYRTEC ALLERGY) 10 MG tablet Take 10 mg by mouth      dicyclomine (BENTYL) 20 MG tablet Take by mouth      insulin lispro (HUMALOG KWIKPEN) 100 UNIT/ML pen Humalog KwikPen (U-100) Insulin 100 unit/mL subcutaneous      naloxone (NARCAN) 4 MG/0.1ML LIQD nasal spray Narcan 4 mg/actuation nasal spray      nitroGLYCERIN (NITROSTAT) 0.3 MG SL tablet DISOLVE 1 TABLET UNDER THE TONGUE AS NEEDED FOR CHEST PAIN. IF NO REFLIEF CALL 911      montelukast (SINGULAIR) 10 MG tablet TAKE 1 TABLET BY MOUTH DAILY AT BEDTIME      linagliptin (TRADJENTA) 5 MG tablet Take by mouth      levothyroxine (SYNTHROID) 88 MCG tablet Take 88 mcg by mouth      irbesartan (AVAPRO) 300 MG tablet Take 150 mg by mouth      DULoxetine (CYMBALTA) 60 MG extended release capsule Take 60 mg by mouth      insulin regular human (HUMULIN R U-500) 500 UNIT/ML concentrated injection vial 24 units in am, 21 units in pm      diclofenac sodium 1 % GEL apply 2gms to areas of joint pain up to four times a day. Avoid contact with eyes.  Do not exceed 32gm/day      chlorthalidone (HYGROTON) 25 MG tablet Take 25 mg by mouth      albuterol sulfate  (90 Base) MCG/ACT inhaler Inhale 2 puffs into the lungs every 6 hours as needed for Wheezing      ALPRAZolam (XANAX) 0.5 MG tablet alprazolam 0.5 mg tablet      betamethasone dipropionate (DIPROLENE) 0.05 % cream betamethasone dipropionate 0.05 % topical cream      clobetasol (TEMOVATE) 0.05 % external solution clobetasol 0.05 % scalp solution      furosemide (LASIX) 20 MG tablet furosemide 20 mg tablet      hydrochlorothiazide (MICROZIDE) 12.5 MG capsule hydrochlorothiazide 12.5 mg capsule      butalbital-aspirin-caffeine-codeine (FIORINAL WITH CODEINE) -82-30 MG capsule Take 1 capsule by mouth      carisoprodol (SOMA) 350 MG tablet Take 350 mg by mouth      nebivolol (BYSTOLIC) 20 MG TABS tablet Take 20 mg by mouth      DULoxetine (CYMBALTA) 20 MG extended release capsule Take 20 mg by mouth      HYDROmorphone (EXALGO) 8 MG T24A extended release tablet Take 8 mg by mouth .  Evolocumab 140 MG/ML SOAJ Inject 140 mg into the skin      HYDROmorphone (DILAUDID) 4 MG tablet Take 4 mg by mouth . No current facility-administered medications for this visit. Review of Systems   Constitutional: Negative for fever. HENT: Negative for ear pain, tinnitus and trouble swallowing. Eyes: Negative for photophobia and visual disturbance. Respiratory: Negative for choking and shortness of breath. Cardiovascular: Negative for chest pain and palpitations. Gastrointestinal: Negative for nausea and vomiting. Musculoskeletal: Negative for back pain, gait problem, joint swelling, myalgias, neck pain and neck stiffness. Skin: Negative for color change. Allergic/Immunologic: Negative for food allergies. Neurological: Negative for dizziness, tremors, seizures, syncope, facial asymmetry, speech difficulty, weakness, light-headedness, numbness and headaches. Psychiatric/Behavioral: Negative for behavioral problems, confusion, hallucinations and sleep disturbance. Objective:   /60 (Site: Right Upper Arm, Position: Sitting, Cuff Size: Medium Adult)   Pulse 63   Wt 276 lb 12.8 oz (125.6 kg)   BMI 45.36 kg/m²     Physical Exam  Vitals signs reviewed. Eyes:      Pupils: Pupils are equal, round, and reactive to light. Neck:      Musculoskeletal: Normal range of motion. Cardiovascular:      Rate and Rhythm: Normal rate and regular rhythm. Heart sounds: No murmur. Pulmonary:      Effort: Pulmonary effort is normal.      Breath sounds: Normal breath sounds. Abdominal:      General: Bowel sounds are normal.   Musculoskeletal: Normal range of motion. Skin:     General: Skin is warm.    Neurological:      Mental Status: She is alert and oriented intractable     Peripheral neuropathy like related to underlying diabetes. We have tried her on Lyrica with headaches. She is on Cymbalta 60 mg and has some partial response but she is not able to increase the medication as she further has headaches. She has not had any migraine headaches of recent. Patient's main issues appears to be considerable back pain and neck pain she has had surgical interventions. She is complaining of some new symptoms which only occur when she lies down she has numbness of the legs when she lies down. This is of concern is recurrent stenosis can occur. CT scans are not good enough and therefore recommend an MRI she has a spinal stimulator which may need to be adjusted at that time. With this we will arrange for an MRI of the cervical spine at the same time given her a failed neck syndrome to make sure nothing new has happened in the last years that we have not investigated her. Will follow her as soon as we have the results. She is on Soma as well. She has vascular disease continues on Plavix without any bleeding or bruising and continues on baclofen. She is under pain management        Plan:      No orders of the defined types were placed in this encounter. No orders of the defined types were placed in this encounter. No follow-ups on file.       Lio Magdaleno MD

## 2020-10-29 ENCOUNTER — TELEPHONE (OUTPATIENT)
Dept: NEUROLOGY | Age: 65
End: 2020-10-29

## 2020-10-29 NOTE — TELEPHONE ENCOUNTER
Patient called states that she does have a tens unit in her back and one of the leads is not functioning properly so she is unable to turn it off so that she can complete her MRI. She will be seeing her pain management doctor in the next few weeks and will update us if she can complete it.

## 2021-04-16 PROBLEM — J43.2 CENTRILOBULAR EMPHYSEMA (HCC): Status: ACTIVE | Noted: 2019-12-04

## 2021-04-16 PROBLEM — L90.5 SCAR IRRITATION: Status: ACTIVE | Noted: 2020-09-23

## 2021-04-16 PROBLEM — Z86.79 HISTORY OF PERIPHERAL VASCULAR DISEASE: Status: ACTIVE | Noted: 2018-04-03

## 2021-04-16 PROBLEM — Z87.39 HISTORY OF ARTHRITIS: Status: ACTIVE | Noted: 2021-04-16

## 2021-04-16 PROBLEM — M79.645 PAIN IN FINGER OF BOTH HANDS: Status: ACTIVE | Noted: 2020-06-30

## 2021-04-16 PROBLEM — M67.441 DIGITAL MUCOUS CYST OF FINGER OF RIGHT HAND: Status: ACTIVE | Noted: 2019-11-27

## 2021-04-16 PROBLEM — E11.42 DIABETIC POLYNEUROPATHY ASSOCIATED WITH TYPE 2 DIABETES MELLITUS (HCC): Status: ACTIVE | Noted: 2021-04-16

## 2021-04-16 PROBLEM — Z98.890 POST-OPERATIVE STATE: Status: ACTIVE | Noted: 2020-07-01

## 2021-04-16 PROBLEM — E11.51 PERIPHERAL ANGIOPATHY DUE TO TYPE 2 DIABETES MELLITUS (HCC): Status: ACTIVE | Noted: 2021-04-16

## 2021-04-16 PROBLEM — Z87.01 H/O: PNEUMONIA: Status: ACTIVE | Noted: 2021-04-16

## 2021-04-16 PROBLEM — J45.909 ASTHMA: Status: ACTIVE | Noted: 2021-04-16

## 2021-04-16 PROBLEM — Z87.448 HISTORY OF KIDNEY DISEASE: Status: ACTIVE | Noted: 2021-04-16

## 2021-04-16 PROBLEM — E04.1 THYROID NODULE: Status: ACTIVE | Noted: 2021-04-16

## 2021-04-16 PROBLEM — M79.644 PAIN IN FINGER OF BOTH HANDS: Status: ACTIVE | Noted: 2020-06-30

## 2021-04-16 PROBLEM — N18.30 CKD (CHRONIC KIDNEY DISEASE) STAGE 3, GFR 30-59 ML/MIN (HCC): Status: ACTIVE | Noted: 2020-07-22

## 2021-04-23 ENCOUNTER — OFFICE VISIT (OUTPATIENT)
Dept: NEUROLOGY | Age: 66
End: 2021-04-23
Payer: MEDICARE

## 2021-04-23 VITALS
HEART RATE: 79 BPM | BODY MASS INDEX: 45.23 KG/M2 | SYSTOLIC BLOOD PRESSURE: 132 MMHG | DIASTOLIC BLOOD PRESSURE: 72 MMHG | WEIGHT: 276 LBS

## 2021-04-23 DIAGNOSIS — G43.709 CHRONIC MIGRAINE WITHOUT AURA WITHOUT STATUS MIGRAINOSUS, NOT INTRACTABLE: ICD-10-CM

## 2021-04-23 DIAGNOSIS — M54.2 CERVICALGIA: Primary | ICD-10-CM

## 2021-04-23 DIAGNOSIS — E11.42 DIABETIC POLYNEUROPATHY ASSOCIATED WITH TYPE 2 DIABETES MELLITUS (HCC): ICD-10-CM

## 2021-04-23 DIAGNOSIS — M48.062 SPINAL STENOSIS OF LUMBAR REGION WITH NEUROGENIC CLAUDICATION: ICD-10-CM

## 2021-04-23 PROCEDURE — 4040F PNEUMOC VAC/ADMIN/RCVD: CPT | Performed by: PSYCHIATRY & NEUROLOGY

## 2021-04-23 PROCEDURE — G8417 CALC BMI ABV UP PARAM F/U: HCPCS | Performed by: PSYCHIATRY & NEUROLOGY

## 2021-04-23 PROCEDURE — 99213 OFFICE O/P EST LOW 20 MIN: CPT | Performed by: PSYCHIATRY & NEUROLOGY

## 2021-04-23 PROCEDURE — 1036F TOBACCO NON-USER: CPT | Performed by: PSYCHIATRY & NEUROLOGY

## 2021-04-23 PROCEDURE — 1090F PRES/ABSN URINE INCON ASSESS: CPT | Performed by: PSYCHIATRY & NEUROLOGY

## 2021-04-23 PROCEDURE — G8427 DOCREV CUR MEDS BY ELIG CLIN: HCPCS | Performed by: PSYCHIATRY & NEUROLOGY

## 2021-04-23 PROCEDURE — 3046F HEMOGLOBIN A1C LEVEL >9.0%: CPT | Performed by: PSYCHIATRY & NEUROLOGY

## 2021-04-23 PROCEDURE — G8400 PT W/DXA NO RESULTS DOC: HCPCS | Performed by: PSYCHIATRY & NEUROLOGY

## 2021-04-23 PROCEDURE — 2022F DILAT RTA XM EVC RTNOPTHY: CPT | Performed by: PSYCHIATRY & NEUROLOGY

## 2021-04-23 PROCEDURE — 3017F COLORECTAL CA SCREEN DOC REV: CPT | Performed by: PSYCHIATRY & NEUROLOGY

## 2021-04-23 PROCEDURE — 1123F ACP DISCUSS/DSCN MKR DOCD: CPT | Performed by: PSYCHIATRY & NEUROLOGY

## 2021-04-23 RX ORDER — LIDOCAINE 50 MG/G
2 PATCH TOPICAL 2 TIMES DAILY
COMMUNITY
Start: 2021-04-22

## 2021-04-23 RX ORDER — PREGABALIN 75 MG/1
CAPSULE ORAL
COMMUNITY
Start: 2021-03-15 | End: 2022-06-03

## 2021-04-23 RX ORDER — VALACYCLOVIR HYDROCHLORIDE 1 G/1
TABLET, FILM COATED ORAL
COMMUNITY
Start: 2021-02-22 | End: 2021-11-01

## 2021-04-23 ASSESSMENT — ENCOUNTER SYMPTOMS
BACK PAIN: 1
SHORTNESS OF BREATH: 0
VOMITING: 0
PHOTOPHOBIA: 0
NAUSEA: 0
TROUBLE SWALLOWING: 0
COLOR CHANGE: 0
CHOKING: 0

## 2021-04-23 NOTE — PROGRESS NOTES
SPINE SURGERY      NOSE SURGERY      DNS repair    RECTOCELE REPAIR      TONSILLECTOMY AND ADENOIDECTOMY       Social History     Socioeconomic History    Marital status: Single     Spouse name: Not on file    Number of children: Not on file    Years of education: Not on file    Highest education level: Not on file   Occupational History    Not on file   Social Needs    Financial resource strain: Not on file    Food insecurity     Worry: Not on file     Inability: Not on file    Transportation needs     Medical: Not on file     Non-medical: Not on file   Tobacco Use    Smoking status: Former Smoker     Packs/day: 1.00     Years: 45.00     Pack years: 45.00     Types: Cigarettes     Quit date: 5/23/2010     Years since quitting: 10.9    Smokeless tobacco: Never Used   Substance and Sexual Activity    Alcohol use: No    Drug use: No    Sexual activity: Not on file   Lifestyle    Physical activity     Days per week: Not on file     Minutes per session: Not on file    Stress: Not on file   Relationships    Social connections     Talks on phone: Not on file     Gets together: Not on file     Attends Yazidi service: Not on file     Active member of club or organization: Not on file     Attends meetings of clubs or organizations: Not on file     Relationship status: Not on file    Intimate partner violence     Fear of current or ex partner: Not on file     Emotionally abused: Not on file     Physically abused: Not on file     Forced sexual activity: Not on file   Other Topics Concern    Not on file   Social History Narrative    Not on file     Family History   Problem Relation Age of Onset    High Blood Pressure Mother     High Cholesterol Mother     Cancer Mother     Coronary Art Dis Mother         lung    Other Mother         PVD    Heart Disease Father     Cancer Father         bladder    Glaucoma Sister     High Blood Pressure Sister     High Cholesterol Sister     Alcohol Abuse Brother      Allergies   Allergen Reactions    Aspirin Swelling    Sulfa Antibiotics Hives     Bactrim    Ace Inhibitors      vasotec    Atenolol     Benzodiazepines      xanax    Calcium Channel Blockers     Cephalosporins Hives     Ceclor  Skin test negative to PCN 3/21/16    Ciprofloxacin Swelling    Citalopram      celexa    Desipramine      Other reaction(s):  Other: See Comments  Headache     Diltiazem     Estrogens      estrace    Flonase [Fluticasone Propionate]     Gabapentin     Hydralazine     Insulin Isophane      bloating    Levofloxacin     Macrolides And Ketolides     Maprotiline      remeron    Niacin And Related      niaspan    Nitroglycerin     Nortriptyline      Other reaction(s): Unknown  Headache     Norvasc [Amlodipine Besylate]     Nsaids     Penicillins     Rosiglitazone     Statins     Sulfonylureas      amaryl, glucotrol    Tetracyclines & Related     Tolterodine      detrol    Tramadol     Tylenol [Acetaminophen]     Valproic Acid And Related     Venlafaxine      effexor    Verapamil     Vit D-Vit E-Safflower Oil     Adhesive Tape Rash    Glyburide Rash    Hydroxychloroquine Hives and Rash    Sulfamethoxazole-Trimethoprim Itching and Rash       Current Outpatient Medications   Medication Sig Dispense Refill    pregabalin (LYRICA) 75 MG capsule TAKE 1 CAPSULE BY MOUTH TWICE DAILY      lidocaine (LIDODERM) 5 %       valACYclovir (VALTREX) 1 g tablet TAKE 1 TABLET BY MOUTH THREE TIMES DAILY      cloNIDine (CATAPRES) 0.1 MG tablet clonidine HCl 0.1 mg tablet      clopidogrel (PLAVIX) 75 MG tablet Take by mouth      febuxostat (ULORIC) 40 MG TABS tablet febuxostat 40 mg tablet      CONTOUR NEXT TEST strip USE 4 STRIPS DAILY      VORTIoxetine (TRINTELLIX) 5 MG tablet Take by mouth      doxazosin (CARDURA) 2 MG tablet doxazosin 2 mg tablet      ALPRAZolam (XANAX) 0.5 MG tablet alprazolam 0.5 mg tablet      baclofen (LIORESAL) 10 MG tablet baclofen 10 mg tablet      betamethasone dipropionate (DIPROLENE) 0.05 % cream betamethasone dipropionate 0.05 % topical cream      carvedilol (COREG) 25 MG tablet TK 1 T PO BID  3    cetirizine (ZYRTEC ALLERGY) 10 MG tablet Take 10 mg by mouth      clobetasol (TEMOVATE) 0.05 % external solution clobetasol 0.05 % scalp solution      dicyclomine (BENTYL) 20 MG tablet Take by mouth      furosemide (LASIX) 20 MG tablet furosemide 20 mg tablet      hydrochlorothiazide (MICROZIDE) 12.5 MG capsule hydrochlorothiazide 12.5 mg capsule      insulin lispro (HUMALOG KWIKPEN) 100 UNIT/ML pen Humalog KwikPen (U-100) Insulin 100 unit/mL subcutaneous      naloxone (NARCAN) 4 MG/0.1ML LIQD nasal spray Narcan 4 mg/actuation nasal spray      butalbital-aspirin-caffeine-codeine (FIORINAL WITH CODEINE) -53-30 MG capsule Take 1 capsule by mouth      carisoprodol (SOMA) 350 MG tablet Take 350 mg by mouth      nitroGLYCERIN (NITROSTAT) 0.3 MG SL tablet DISOLVE 1 TABLET UNDER THE TONGUE AS NEEDED FOR CHEST PAIN. IF NO REFLIEF CALL 911      nebivolol (BYSTOLIC) 20 MG TABS tablet Take 20 mg by mouth      montelukast (SINGULAIR) 10 MG tablet TAKE 1 TABLET BY MOUTH DAILY AT BEDTIME      linagliptin (TRADJENTA) 5 MG tablet Take by mouth      levothyroxine (SYNTHROID) 88 MCG tablet Take 88 mcg by mouth      irbesartan (AVAPRO) 300 MG tablet Take 150 mg by mouth      DULoxetine (CYMBALTA) 60 MG extended release capsule Take 60 mg by mouth      insulin regular human (HUMULIN R U-500) 500 UNIT/ML concentrated injection vial 24 units in am, 21 units in pm      Evolocumab 140 MG/ML SOAJ Inject 140 mg into the skin      diclofenac sodium 1 % GEL apply 2gms to areas of joint pain up to four times a day. Avoid contact with eyes.  Do not exceed 32gm/day      chlorthalidone (HYGROTON) 25 MG tablet Take 25 mg by mouth      albuterol sulfate  (90 Base) MCG/ACT inhaler Inhale 2 puffs into the lungs every 6 hours as needed for Wheezing       No current facility-administered medications for this visit. Review of Systems   Constitutional: Negative for fever. HENT: Negative for ear pain, tinnitus and trouble swallowing. Eyes: Negative for photophobia and visual disturbance. Respiratory: Negative for choking and shortness of breath. Cardiovascular: Negative for chest pain and palpitations. Gastrointestinal: Negative for nausea and vomiting. Musculoskeletal: Positive for back pain, myalgias and neck pain. Negative for gait problem, joint swelling and neck stiffness. Skin: Negative for color change. Allergic/Immunologic: Negative for food allergies. Neurological: Positive for weakness, numbness and headaches. Negative for dizziness, tremors, seizures, syncope, facial asymmetry, speech difficulty and light-headedness. Psychiatric/Behavioral: Negative for behavioral problems, confusion, hallucinations and sleep disturbance. Objective:   /72 (Site: Left Upper Arm, Position: Sitting, Cuff Size: Large Adult)   Pulse 79   Wt 276 lb (125.2 kg)   BMI 45.23 kg/m²     Physical Exam  Neurological:      Deep Tendon Reflexes:      Reflex Scores:       Tricep reflexes are 1+ on the right side and 1+ on the left side. Bicep reflexes are 0 on the right side and 0 on the left side. Brachioradialis reflexes are 0 on the right side and 0 on the left side. Patellar reflexes are 0 on the right side. Achilles reflexes are 0 on the right side and 0 on the left side. Comments: Patient has just general weakness in the legs with areflexia. Patient has difficulty moving due to the back         No results found.     Lab Results   Component Value Date    WBC 9.6 10/23/2017    RBC 3.77 10/23/2017    HGB 10.0 10/23/2017    HCT 30.7 10/23/2017    MCV 81.5 10/23/2017    MCH 26.5 10/23/2017    MCHC 32.5 10/23/2017    RDW 16.6 10/23/2017     10/23/2017     Lab Results   Component Value Date    NA 135 01/08/2020    K 4.7 01/08/2020     01/08/2020    CO2 22 10/23/2017    BUN 37 10/23/2017    CREATININE 1.40 01/08/2020    GFRAA 46 01/08/2020    LABGLOM 38 01/08/2020    GLUCOSE 262 01/08/2020    PROT 7.2 01/08/2020    LABALBU 4.2 01/08/2020    CALCIUM 8.8 01/08/2020    BILITOT 0.5 01/08/2020    ALKPHOS 87 01/08/2020    AST 9 01/08/2020    ALT 10 01/08/2020     No results found for: PROTIME, INR  No results found for: TSH, [id number], FOLATE, FERRITIN, IRON, TIBC, PTRFSAT, TSH, FREET4  Lab Results   Component Value Date    TRIG 155 01/08/2020    HDL 39.0 01/08/2020    LABVLDL 31 01/08/2020     No results found for: Leontine Gauze, LABBENZ, CANNAB, COCAINESCRN, LABMETH, OPIATESCREENURINE, PHENCYCLIDINESCREENURINE, PPXUR, ETOH  No results found for: LITHIUM, DILFRTOT, VALPROATE    Assessment:       Diagnosis Orders   1. Cervicalgia     2. Chronic migraine without aura without status migrainosus, not intractable     3. Diabetic polyneuropathy associated with type 2 diabetes mellitus ([facility] [address]     4. Spinal stenosis of lumbar region with neurogenic claudication     Failed neck syndrome with previous surgical intervention. Patient also has lumbar colopathy. We were not able to obtain an MRI as she has a spinal stimulator. This is due to come out soon I recommend that she call me as we need to reevaluate her back she had cauda equina syndrome in the past.    Patient is on high-dose Cymbalta which we prescribed she is on 90 mg. We are not able to further increase this due to headaches. She did not tolerate break-up. She has peripheral neuropathy. Patient has migraine headaches which have not changed much. Follow in 6 months and earlier if any concerns      Plan:      No orders of the defined types were placed in this encounter. No orders of the defined types were placed in this encounter. Return in about 6 months (around 10/23/2021).       Osmani Campos MD

## 2021-05-16 PROBLEM — Z98.890 POST-OPERATIVE STATE: Status: RESOLVED | Noted: 2020-07-01 | Resolved: 2021-05-16

## 2021-10-26 PROBLEM — Z12.31 ENCOUNTER FOR SCREENING MAMMOGRAM FOR MALIGNANT NEOPLASM OF BREAST: Status: ACTIVE | Noted: 2018-04-11

## 2021-10-26 PROBLEM — I65.29 OCCLUSION AND STENOSIS OF UNSPECIFIED CAROTID ARTERY: Status: ACTIVE | Noted: 2019-01-04

## 2021-10-26 PROBLEM — B02.9 HERPES ZOSTER: Status: ACTIVE | Noted: 2021-10-26

## 2021-10-29 ENCOUNTER — OFFICE VISIT (OUTPATIENT)
Dept: NEUROLOGY | Age: 66
End: 2021-10-29
Payer: MEDICARE

## 2021-10-29 VITALS
DIASTOLIC BLOOD PRESSURE: 74 MMHG | SYSTOLIC BLOOD PRESSURE: 120 MMHG | HEART RATE: 81 BPM | BODY MASS INDEX: 45.07 KG/M2 | WEIGHT: 275 LBS

## 2021-10-29 DIAGNOSIS — G43.709 CHRONIC MIGRAINE WITHOUT AURA WITHOUT STATUS MIGRAINOSUS, NOT INTRACTABLE: ICD-10-CM

## 2021-10-29 DIAGNOSIS — M48.062 SPINAL STENOSIS OF LUMBAR REGION WITH NEUROGENIC CLAUDICATION: ICD-10-CM

## 2021-10-29 DIAGNOSIS — M54.2 CERVICALGIA: ICD-10-CM

## 2021-10-29 DIAGNOSIS — E11.42 DIABETIC POLYNEUROPATHY ASSOCIATED WITH TYPE 2 DIABETES MELLITUS (HCC): Primary | ICD-10-CM

## 2021-10-29 DIAGNOSIS — I65.23 INTERNAL CAROTID ARTERY STENOSIS, BILATERAL: ICD-10-CM

## 2021-10-29 PROCEDURE — 1090F PRES/ABSN URINE INCON ASSESS: CPT | Performed by: PSYCHIATRY & NEUROLOGY

## 2021-10-29 PROCEDURE — 1036F TOBACCO NON-USER: CPT | Performed by: PSYCHIATRY & NEUROLOGY

## 2021-10-29 PROCEDURE — 4040F PNEUMOC VAC/ADMIN/RCVD: CPT | Performed by: PSYCHIATRY & NEUROLOGY

## 2021-10-29 PROCEDURE — 1123F ACP DISCUSS/DSCN MKR DOCD: CPT | Performed by: PSYCHIATRY & NEUROLOGY

## 2021-10-29 PROCEDURE — G8400 PT W/DXA NO RESULTS DOC: HCPCS | Performed by: PSYCHIATRY & NEUROLOGY

## 2021-10-29 PROCEDURE — 2022F DILAT RTA XM EVC RTNOPTHY: CPT | Performed by: PSYCHIATRY & NEUROLOGY

## 2021-10-29 PROCEDURE — G8427 DOCREV CUR MEDS BY ELIG CLIN: HCPCS | Performed by: PSYCHIATRY & NEUROLOGY

## 2021-10-29 PROCEDURE — 3046F HEMOGLOBIN A1C LEVEL >9.0%: CPT | Performed by: PSYCHIATRY & NEUROLOGY

## 2021-10-29 PROCEDURE — G8484 FLU IMMUNIZE NO ADMIN: HCPCS | Performed by: PSYCHIATRY & NEUROLOGY

## 2021-10-29 PROCEDURE — 3017F COLORECTAL CA SCREEN DOC REV: CPT | Performed by: PSYCHIATRY & NEUROLOGY

## 2021-10-29 PROCEDURE — 99214 OFFICE O/P EST MOD 30 MIN: CPT | Performed by: PSYCHIATRY & NEUROLOGY

## 2021-10-29 PROCEDURE — G8417 CALC BMI ABV UP PARAM F/U: HCPCS | Performed by: PSYCHIATRY & NEUROLOGY

## 2021-10-29 RX ORDER — ERGOCALCIFEROL 1.25 MG/1
CAPSULE ORAL
COMMUNITY
Start: 2021-08-26

## 2021-10-29 RX ORDER — VORTIOXETINE 10 MG/1
10 TABLET, FILM COATED ORAL DAILY
COMMUNITY
Start: 2021-09-01

## 2021-10-29 ASSESSMENT — ENCOUNTER SYMPTOMS
COLOR CHANGE: 0
NAUSEA: 0
VOMITING: 0
BACK PAIN: 0
CHOKING: 0
PHOTOPHOBIA: 0
TROUBLE SWALLOWING: 0
SHORTNESS OF BREATH: 0

## 2021-10-29 NOTE — PROGRESS NOTES
Subjective:      Patient ID: Rimma Elizabeth is a 74 y.o. female who presents today for:  Chief Complaint   Patient presents with    Follow-up     Pt states that she  has been having a lot of problmes with her hands, she says trigger finer in her thmb, and the muscles in her thumbs hurt. HPI 47-year-old right-handed female with history of significant diabetic neuropathy with chronic migraine headaches with cervicalgia with lumbar canal stenosis. Patient is on high-dose Cymbalta which is helping. She also sees pain management. She has a spinal stimulator and therefore we have not been able to obtain an MRI. Patient was seen at [facility] and she had some vascular work-up done I did review the results and there appears to be some concern about a left carotid stenosis. The study does not appear to be adequate and she will require follow-up evaluation. She is not a candidate for CT angiogram given that she has only got one kidney not functioning 100%. We rather not use a dye. We will then arrange for an MRA without contrast.  The stimulator is likely to be discontinued soon and we can follow this up. She is already on Plavix and Repatha which she will continue. Patient will then require an MRI of the cervical and lumbar spine we will do this altogether at 1 point in time. Patient is having some numbness in the hands and the subclavian artery relations were noted and they appear to be an adequate. Peripheral neuropathy which is not quite bothersome since she has been on Cymbalta without any side effects.     Past Medical History:   Diagnosis Date    Diabetes mellitus ([address]     hx since 10/1992    Hiatal hernia     Hyperlipidemia     past meds / now on Repatha    Hypertension     meds > 68 yrs / denies TIA or stroke    Inflammatory arthritis 11/20/2012    AMANDEEP (obstructive sleep apnea)     DOES NOT USE CPAP    PVD (peripheral vascular disease) ([address]     2 stents left thigh    Renal artery occlusion (HCC)     stents of right    Renal insufficiency     Thyroid disease     meds > 4 yrs     Past Surgical History:   Procedure Laterality Date    BACK SURGERY      C 3-4 fusion    BACK SURGERY      L 3-4-5 laminectomy    CARPAL TUNNEL RELEASE Bilateral     CERVICAL SPINE SURGERY      CHOLECYSTECTOMY      COLONOSCOPY      CYSTOCELE REPAIR      ENDOSCOPY, COLON, DIAGNOSTIC      FEMORAL BYPASS       left thigh stent     HYSTERECTOMY      IMPLANTATION VAGAL NERVE STIMULATOR N/A 10/30/2017    SPINAL CORD STIMULATOR IMPLANT (Ashley De Paz) performed by Luis Dewitt MD at [facility]      renal stent placement right    LUMBAR SPINE SURGERY      NOSE SURGERY      DNS repair    RECTOCELE REPAIR      TONSILLECTOMY AND ADENOIDECTOMY       Social History     Socioeconomic History    Marital status: Single     Spouse name: Not on file    Number of children: Not on file    Years of education: Not on file    Highest education level: Not on file   Occupational History    Not on file   Tobacco Use    Smoking status: Former Smoker     Packs/day: 1.00     Years: 45.00     Pack years: 45.00     Types: Cigarettes     Quit date: 2010     Years since quittin.4    Smokeless tobacco: Never Used   Substance and Sexual Activity    Alcohol use: No    Drug use: No    Sexual activity: Not on file   Other Topics Concern    Not on file   Social History Narrative    Not on file     Social Determinants of Health     Financial Resource Strain:     Difficulty of Paying Living Expenses:    Food Insecurity:     Worried About Running Out of Food in the Last Year:     [address] in the Last Year:    Transportation Needs:     Lack of Transportation (Medical):      Lack of Transportation (Non-Medical):    Physical Activity:     Days of Exercise per Week:     Minutes of Exercise per Session:    Stress:     Feeling of Stress :    Social Connections:     Frequency of Communication with Friends and Family:     Frequency of Social Gatherings with Friends and Family:     Attends Yazidi Services:     Active Member of Clubs or Organizations:     Attends Club or Organization Meetings:     Marital Status:    Intimate Partner Violence:     Fear of Current or Ex-Partner:     Emotionally Abused:     Physically Abused:     Sexually Abused:      Family History   Problem Relation Age of Onset    High Blood Pressure Mother     High Cholesterol Mother     Cancer Mother     Coronary Art Dis Mother         lung    Other Mother         PVD    Heart Disease Father     Cancer Father         bladder    Glaucoma Sister     High Blood Pressure Sister     High Cholesterol Sister     Alcohol Abuse Brother      Allergies   Allergen Reactions    Aspirin Swelling    Sulfa Antibiotics Hives     Bactrim    Ace Inhibitors      vasotec    Aripiprazole     Atenolol     Benzodiazepines      xanax    Calcium Channel Blockers     Cephalosporins Hives     Ceclor  Skin test negative to PCN 3/21/16    Ciprofloxacin Swelling    Citalopram      celexa    Desipramine      Other reaction(s):  Other: See Comments  Headache     Diltiazem     Estrogens      estrace    Flonase [Fluticasone Propionate]     Gabapentin     Hydralazine     Insulin Isophane      bloating    Levofloxacin     Macrolides And Ketolides     Maprotiline      remeron    Niacin And Related      niaspan    Nitroglycerin     Nortriptyline      Other reaction(s): Unknown  Headache     Norvasc [Amlodipine Besylate]     Nsaids     Penicillins     Rosiglitazone     Statins     Sulfonylureas      amaryl, glucotrol    Tetracyclines & Related     Tolterodine      detrol    Tramadol     Tylenol [Acetaminophen]     Valproic Acid And Related     Venlafaxine      effexor    Verapamil     Vit D-Vit E-Safflower Oil     Adhesive Tape Rash    Glyburide Rash    Hydroxychloroquine Hives and Rash    Sulfamethoxazole-Trimethoprim Itching and Rash       Current Outpatient Medications   Medication Sig Dispense Refill    vitamin D (ERGOCALCIFEROL) 1.25 MG (05282 UT) CAPS capsule TAKE 1 CAPSULE BY MOUTH 1 TIME A WEEK      TRINTELLIX 10 MG TABS tablet TAKE 1 TABLET BY MOUTH EVERY DAY      pregabalin (LYRICA) 75 MG capsule TAKE 1 CAPSULE BY MOUTH TWICE DAILY      lidocaine (LIDODERM) 5 %       valACYclovir (VALTREX) 1 g tablet TAKE 1 TABLET BY MOUTH THREE TIMES DAILY      cloNIDine (CATAPRES) 0.1 MG tablet clonidine HCl 0.1 mg tablet      clopidogrel (PLAVIX) 75 MG tablet Take by mouth      febuxostat (ULORIC) 40 MG TABS tablet febuxostat 40 mg tablet      CONTOUR NEXT TEST strip USE 4 STRIPS DAILY      doxazosin (CARDURA) 2 MG tablet doxazosin 2 mg tablet      ALPRAZolam (XANAX) 0.5 MG tablet alprazolam 0.5 mg tablet      baclofen (LIORESAL) 10 MG tablet baclofen 10 mg tablet      betamethasone dipropionate (DIPROLENE) 0.05 % cream betamethasone dipropionate 0.05 % topical cream      carvedilol (COREG) 25 MG tablet TK 1 T PO BID  3    cetirizine (ZYRTEC ALLERGY) 10 MG tablet Take 10 mg by mouth      clobetasol (TEMOVATE) 0.05 % external solution clobetasol 0.05 % scalp solution      dicyclomine (BENTYL) 20 MG tablet Take by mouth      furosemide (LASIX) 20 MG tablet furosemide 20 mg tablet      hydrochlorothiazide (MICROZIDE) 12.5 MG capsule hydrochlorothiazide 12.5 mg capsule      insulin lispro (HUMALOG KWIKPEN) 100 UNIT/ML pen Humalog KwikPen (U-100) Insulin 100 unit/mL subcutaneous      naloxone (NARCAN) 4 MG/0.1ML LIQD nasal spray Narcan 4 mg/actuation nasal spray      butalbital-aspirin-caffeine-codeine (FIORINAL WITH CODEINE) -12-30 MG capsule Take 1 capsule by mouth      carisoprodol (SOMA) 350 MG tablet Take 350 mg by mouth      nitroGLYCERIN (NITROSTAT) 0.3 MG SL tablet DISOLVE 1 TABLET UNDER THE TONGUE AS NEEDED FOR CHEST PAIN.  IF NO REFLIEF CALL 911      nebivolol (BYSTOLIC) 20 MG TABS tablet Take 20 mg by mouth      montelukast (SINGULAIR) 10 MG tablet TAKE 1 TABLET BY MOUTH DAILY AT BEDTIME      linagliptin (TRADJENTA) 5 MG tablet Take by mouth      levothyroxine (SYNTHROID) 88 MCG tablet Take 88 mcg by mouth      irbesartan (AVAPRO) 300 MG tablet Take 150 mg by mouth      DULoxetine (CYMBALTA) 60 MG extended release capsule Take 60 mg by mouth      insulin regular human (HUMULIN R U-500) 500 UNIT/ML concentrated injection vial 24 units in am, 21 units in pm      Evolocumab 140 MG/ML SOAJ Inject 140 mg into the skin      diclofenac sodium 1 % GEL apply 2gms to areas of joint pain up to four times a day. Avoid contact with eyes. Do not exceed 32gm/day      chlorthalidone (HYGROTON) 25 MG tablet Take 25 mg by mouth      albuterol sulfate  (90 Base) MCG/ACT inhaler Inhale 2 puffs into the lungs every 6 hours as needed for Wheezing       No current facility-administered medications for this visit. Review of Systems   Constitutional: Negative for fever. HENT: Negative for ear pain, tinnitus and trouble swallowing. Eyes: Negative for photophobia and visual disturbance. Respiratory: Negative for choking and shortness of breath. Cardiovascular: Negative for chest pain and palpitations. Gastrointestinal: Negative for nausea and vomiting. Musculoskeletal: Negative for back pain, gait problem, joint swelling, myalgias, neck pain and neck stiffness. Skin: Negative for color change. Allergic/Immunologic: Negative for food allergies. Neurological: Negative for dizziness, tremors, seizures, syncope, facial asymmetry, speech difficulty, weakness, light-headedness, numbness and headaches. Psychiatric/Behavioral: Negative for behavioral problems, confusion, hallucinations and sleep disturbance.        Objective:   /74 (Site: Left Upper Arm, Position: Sitting, Cuff Size: Large Adult)   Pulse 81   Wt 275 lb (124.7 kg)   BMI 45.07 kg/m²     Physical Exam  Vitals reviewed. Eyes:      Pupils: Pupils are equal, round, and reactive to light. Cardiovascular:      Rate and Rhythm: Normal rate and regular rhythm. Heart sounds: No murmur heard. Pulmonary:      Effort: Pulmonary effort is normal.      Breath sounds: Normal breath sounds. Abdominal:      General: Bowel sounds are normal.   Musculoskeletal:         General: Normal range of motion. Cervical back: Normal range of motion. Skin:     General: Skin is warm. Neurological:      Mental Status: She is alert and oriented to person, place, and time. Cranial Nerves: No cranial nerve deficit. Sensory: No sensory deficit. Motor: No abnormal muscle tone. Coordination: Coordination normal.      Deep Tendon Reflexes: Reflexes are normal and symmetric. Babinski sign absent on the right side. Babinski sign absent on the left side. Psychiatric:         Mood and Affect: Mood normal.       No carotid bruits heard and she is areflexic in the lower extremity. No results found.     Lab Results   Component Value Date    WBC 9.6 10/23/2017    RBC 3.77 10/23/2017    HGB 10.0 10/23/2017    HCT 30.7 10/23/2017    MCV 81.5 10/23/2017    MCH 26.5 10/23/2017    MCHC 32.5 10/23/2017    RDW 16.6 10/23/2017     10/23/2017     Lab Results   Component Value Date     01/08/2020    K 4.7 01/08/2020     01/08/2020    CO2 22 10/23/2017    BUN 37 10/23/2017    CREATININE 1.40 01/08/2020    GFRAA 46 01/08/2020    LABGLOM 38 01/08/2020    GLUCOSE 262 01/08/2020    PROT 7.2 01/08/2020    LABALBU 4.2 01/08/2020    CALCIUM 8.8 01/08/2020    BILITOT 0.5 01/08/2020    ALKPHOS 87 01/08/2020    AST 9 01/08/2020    ALT 10 01/08/2020     No results found for: PROTIME, INR  No results found for: TSH, [id number], FOLATE, FERRITIN, IRON, TIBC, PTRFSAT, TSH, FREET4  Lab Results   Component Value Date    TRIG 155 01/08/2020    HDL 39.0 01/08/2020    LABVLDL 31 01/08/2020     No results found for:

## 2021-11-01 ENCOUNTER — OFFICE VISIT (OUTPATIENT)
Dept: FAMILY MEDICINE CLINIC | Age: 66
End: 2021-11-01
Payer: MEDICARE

## 2021-11-01 VITALS
HEART RATE: 61 BPM | DIASTOLIC BLOOD PRESSURE: 78 MMHG | BODY MASS INDEX: 44.2 KG/M2 | OXYGEN SATURATION: 98 % | TEMPERATURE: 98 F | HEIGHT: 66 IN | SYSTOLIC BLOOD PRESSURE: 118 MMHG | WEIGHT: 275 LBS

## 2021-11-01 DIAGNOSIS — Z01.818 PRE-OP EXAM: Primary | ICD-10-CM

## 2021-11-01 DIAGNOSIS — Z01.818 PRE-OP EXAM: ICD-10-CM

## 2021-11-01 PROBLEM — M48.062 PSEUDOCLAUDICATION SYNDROME: Status: ACTIVE | Noted: 2019-01-04

## 2021-11-01 PROBLEM — Z87.891 FORMER SMOKER: Status: ACTIVE | Noted: 2021-08-02

## 2021-11-01 PROBLEM — M15.1 DEGENERATIVE ARTHRITIS OF DISTAL INTERPHALANGEAL JOINT OF INDEX FINGER OF RIGHT HAND: Status: ACTIVE | Noted: 2021-09-16

## 2021-11-01 LAB
ANION GAP SERPL CALCULATED.3IONS-SCNC: 14 MEQ/L (ref 9–15)
BUN BLDV-MCNC: 44 MG/DL (ref 8–23)
CALCIUM SERPL-MCNC: 9 MG/DL (ref 8.5–9.9)
CHLORIDE BLD-SCNC: 108 MEQ/L (ref 95–107)
CO2: 20 MEQ/L (ref 20–31)
CREAT SERPL-MCNC: 1.34 MG/DL (ref 0.5–0.9)
GFR AFRICAN AMERICAN: 47.6
GFR NON-AFRICAN AMERICAN: 39.3
GLUCOSE BLD-MCNC: 82 MG/DL (ref 70–99)
HCT VFR BLD CALC: 31.3 % (ref 37–47)
HEMOGLOBIN: 10 G/DL (ref 12–16)
MCH RBC QN AUTO: 25.4 PG (ref 27–31.3)
MCHC RBC AUTO-ENTMCNC: 31.9 % (ref 33–37)
MCV RBC AUTO: 79.5 FL (ref 82–100)
PDW BLD-RTO: 17 % (ref 11.5–14.5)
PLATELET # BLD: 305 K/UL (ref 130–400)
POTASSIUM SERPL-SCNC: 5.5 MEQ/L (ref 3.4–4.9)
RBC # BLD: 3.94 M/UL (ref 4.2–5.4)
SODIUM BLD-SCNC: 142 MEQ/L (ref 135–144)
WBC # BLD: 14.5 K/UL (ref 4.8–10.8)

## 2021-11-01 PROCEDURE — 99213 OFFICE O/P EST LOW 20 MIN: CPT | Performed by: NURSE PRACTITIONER

## 2021-11-01 PROCEDURE — 93000 ELECTROCARDIOGRAM COMPLETE: CPT | Performed by: NURSE PRACTITIONER

## 2021-11-01 PROCEDURE — G8484 FLU IMMUNIZE NO ADMIN: HCPCS | Performed by: NURSE PRACTITIONER

## 2021-11-01 PROCEDURE — 1090F PRES/ABSN URINE INCON ASSESS: CPT | Performed by: NURSE PRACTITIONER

## 2021-11-01 PROCEDURE — G8417 CALC BMI ABV UP PARAM F/U: HCPCS | Performed by: NURSE PRACTITIONER

## 2021-11-01 PROCEDURE — G8427 DOCREV CUR MEDS BY ELIG CLIN: HCPCS | Performed by: NURSE PRACTITIONER

## 2021-11-01 SDOH — ECONOMIC STABILITY: FOOD INSECURITY: WITHIN THE PAST 12 MONTHS, THE FOOD YOU BOUGHT JUST DIDN'T LAST AND YOU DIDN'T HAVE MONEY TO GET MORE.: NEVER TRUE

## 2021-11-01 SDOH — ECONOMIC STABILITY: FOOD INSECURITY: WITHIN THE PAST 12 MONTHS, YOU WORRIED THAT YOUR FOOD WOULD RUN OUT BEFORE YOU GOT MONEY TO BUY MORE.: NEVER TRUE

## 2021-11-01 ASSESSMENT — SOCIAL DETERMINANTS OF HEALTH (SDOH): HOW HARD IS IT FOR YOU TO PAY FOR THE VERY BASICS LIKE FOOD, HOUSING, MEDICAL CARE, AND HEATING?: NOT HARD AT ALL

## 2021-11-01 NOTE — PROGRESS NOTES
Preoperative Consultation      Annetta Bermeo  YOB: 1955    Date of Service:  11/1/2021    Vitals:    11/01/21 1227   BP: 118/78   Site: Right Upper Arm   Position: Sitting   Cuff Size: Large Adult   Pulse: 61   Temp: 98 °F (36.7 °C)   TempSrc: Temporal   SpO2: 98%   Weight: 275 lb (124.7 kg)   Height: 5' 6\" (1.676 m)      Wt Readings from Last 2 Encounters:   11/01/21 275 lb (124.7 kg)   10/29/21 275 lb (124.7 kg)     BP Readings from Last 3 Encounters:   11/01/21 118/78   10/29/21 120/74   04/23/21 132/72        Chief Complaint   Patient presents with   Shanna Mcclellan Pre-op Exam     Patient here with pre op for (DR. [facility]) [address] [place] [address] Savored. SURGERY 11/5/21     Allergies   Allergen Reactions    Aspirin Swelling    Sulfa Antibiotics Hives     Bactrim    Ace Inhibitors      vasotec    Aripiprazole     Atenolol     Benzodiazepines      xanax    Calcium Channel Blockers     Cephalosporins Hives     Ceclor  Skin test negative to PCN 3/21/16    Ciprofloxacin Swelling    Citalopram      celexa    Desipramine      Other reaction(s):  Other: See Comments  Headache     Diltiazem     Estrogens      estrace    Flonase [Fluticasone Propionate]     Gabapentin     Hydralazine     Insulin Isophane      bloating    Levofloxacin     Macrolides And Ketolides     Maprotiline      remeron    Niacin And Related      niaspan    Nitroglycerin     Nortriptyline      Other reaction(s): Unknown  Headache     Norvasc [Amlodipine Besylate]     Nsaids     Penicillins     Rosiglitazone     Statins     Sulfonylureas      amaryl, glucotrol    Tetracyclines & Related     Tolterodine      detrol    Tramadol     Tylenol [Acetaminophen]     Valproic Acid And Related     Venlafaxine      effexor    Verapamil     Vit D-Vit E-Safflower Oil     Adhesive Tape Rash    Glyburide Rash    Hydroxychloroquine Hives and Rash    Sulfamethoxazole-Trimethoprim Itching and Rash Outpatient Medications Marked as Taking for the 11/1/21 encounter (Office Visit) with Haroon Hardy. Amandeep Age, APRN - CNP   Medication Sig Dispense Refill    NALTREXONE HCL PO Take 4.5 mg by mouth daily      vitamin D (ERGOCALCIFEROL) 1.25 MG (38472 UT) CAPS capsule TAKE 1 CAPSULE BY MOUTH 1 TIME A WEEK      TRINTELLIX 10 MG TABS tablet 10 mg daily       pregabalin (LYRICA) 75 MG capsule TAKE 1 CAPSULE BY MOUTH TWICE DAILY      lidocaine (LIDODERM) 5 % Place 2 patches onto the skin 2 times daily       cloNIDine (CATAPRES) 0.1 MG tablet Take 0.1 mg by mouth as needed Indications: Has taken twice in the last year If BP >170      clopidogrel (PLAVIX) 75 MG tablet Take by mouth      CONTOUR NEXT TEST strip USE 4 STRIPS DAILY      doxazosin (CARDURA) 2 MG tablet Take 2 mg by mouth 2 times daily       baclofen (LIORESAL) 10 MG tablet Take 10 mg by mouth 3 times daily as needed       betamethasone dipropionate (DIPROLENE) 0.05 % cream Indications: Eczema PRN      carvedilol (COREG) 25 MG tablet TK 1 T PO BID  3    cetirizine (ZYRTEC ALLERGY) 10 MG tablet Take 10 mg by mouth      dicyclomine (BENTYL) 20 MG tablet Take by mouth      naloxone (NARCAN) 4 MG/0.1ML LIQD nasal spray Narcan 4 mg/actuation nasal spray      nitroGLYCERIN (NITROSTAT) 0.3 MG SL tablet DISOLVE 1 TABLET UNDER THE TONGUE AS NEEDED FOR CHEST PAIN.  IF NO REFLIEF CALL 911      montelukast (SINGULAIR) 10 MG tablet TAKE 1 TABLET BY MOUTH DAILY AT BEDTIME      linagliptin (TRADJENTA) 5 MG tablet Take 5 mg by mouth daily       levothyroxine (SYNTHROID) 88 MCG tablet Take 88 mcg by mouth Daily       irbesartan (AVAPRO) 300 MG tablet Take 150 mg by mouth 2 times daily       DULoxetine (CYMBALTA) 60 MG extended release capsule Take 60 mg by mouth daily       insulin regular human (HUMULIN R U-500) 500 UNIT/ML concentrated injection vial 20 Units 2 times daily (with meals)       Evolocumab 140 MG/ML SOAJ Inject 140 mg into the skin every 14 Diabetes mellitus ([address]    Venous tributary (branch) occlusion of retina    Vitamin D deficiency    Chronic bilateral low back pain without sciatica    Heart murmur    Hiatal hernia    Kidney disease    Failed back syndrome    Chronic migraine without aura without status migrainosus, not intractable    Other seborrheic keratosis    Acquired hypothyroidism    Anemia, unspecified    Astigmatism with presbyopia    Benign nevus of skin    Degeneration of cervical intervertebral disc    Dermatochalasis    Diverticulitis of sigmoid colon    Hypomagnesemia    Ingrowing nail    Internal carotid artery stenosis, bilateral    Joint stiffness of multiple sites    Leg pain, bilateral    Viral warts    Mild nonproliferative diabetic retinopathy associated with type 2 diabetes mellitus ([facility])    Milia    Muscle spasms of both lower extremities    Obesity, Class III, BMI 40-49.9 (morbid obesity) ([facility])    Obstructive sleep apnea (adult) (pediatric)    Paronychia of toe    Other pain disorders related to psychological factors    Pain in toe    Poor peripheral circulation    Postmenopausal atrophic vaginitis    Preglaucoma, unspecified    Presbyopia    Pruritus    Rash and other nonspecific skin eruption    Secondary osteoarthritis of multiple sites    Senile nuclear sclerosis    Failed neck syndrome    Asthma    Centrilobular emphysema ([facility])    CKD (chronic kidney disease) stage 3, GFR 30-59 ml/min ([facility])    Digital mucous cyst of finger of right hand    H/O: pneumonia    History of arthritis    History of kidney disease    History of peripheral vascular disease    Pain in finger of both hands    Peripheral angiopathy due to type 2 diabetes mellitus ([address]    Scar irritation    Thyroid nodule    Diabetic polyneuropathy associated with type 2 diabetes mellitus (HCC)    Herpes zoster    Occlusion and stenosis of unspecified carotid artery    Encounter for screening mammogram for malignant neoplasm of breast    Pseudoclaudication syndrome    Former smoker    Degenerative arthritis of distal interphalangeal joint of index finger of right hand       Past Medical History:   Diagnosis Date    Allergic rhinitis     Carotid artery stenosis     Cystocele with prolapse     Diabetes mellitus ([facility]     hx since 10/1992    Hiatal hernia     Hyperlipidemia     past meds / now on Repatha    Hypertension     meds > 68 yrs / denies TIA or stroke    Inflammatory arthritis 11/20/2012    Macular edema     Neurogenic claudication (HCC)     Neuropathy     AMANDEEP (obstructive sleep apnea)     DOES NOT USE CPAP    Osteoarthritis     Peripheral vascular disease ([facility]     PVD (peripheral vascular disease) (HCC)     2 stents left thigh    Renal artery occlusion (HCC)     stents of right    Renal insufficiency     Thyroid disease     meds > 4 yrs     Past Surgical History:   Procedure Laterality Date    BACK SURGERY      C 3-4 fusion    BACK SURGERY      L 3-4-5 laminectomy    CARPAL TUNNEL RELEASE Bilateral     CERVICAL SPINE SURGERY      CHOLECYSTECTOMY      COLONOSCOPY      CYSTOCELE REPAIR      ENDOSCOPY, COLON, DIAGNOSTIC      FEMORAL BYPASS       left thigh stent     HYSTERECTOMY      IMPLANTATION VAGAL NERVE STIMULATOR N/A 10/30/2017    SPINAL CORD STIMULATOR IMPLANT ([address]) performed by Nestor Street MD at [place] [address]      renal stent placement right    LUMBAR SPINE SURGERY      NOSE SURGERY      DNS repair    RECTOCELE REPAIR      TONSILLECTOMY AND ADENOIDECTOMY       Family History   Problem Relation Age of Onset    High Blood Pressure Mother     High Cholesterol Mother     Cancer Mother     Coronary Art Dis Mother         lung    Other Mother         PVD    Heart Disease Father     Cancer Father         bladder    Glaucoma Sister     High Blood Pressure Sister     High Cholesterol Sister     Alcohol Abuse Brother      Social History Socioeconomic History    Marital status: Single     Spouse name: Not on file    Number of children: Not on file    Years of education: Not on file    Highest education level: Not on file   Occupational History    Not on file   Tobacco Use    Smoking status: Former Smoker     Packs/day: 1.00     Years: 45.00     Pack years: 45.00     Types: Cigarettes     Quit date: 2010     Years since quittin.4    Smokeless tobacco: Never Used   Substance and Sexual Activity    Alcohol use: No    Drug use: No    Sexual activity: Not on file   Other Topics Concern    Not on file   Social History Narrative    Not on file     Social Determinants of Health     Financial Resource Strain: Low Risk     Difficulty of Paying Living Expenses: Not hard at all   Food Insecurity: No Food Insecurity    Worried About [email or web address] in the Last Year: Never true    [address] [place] in the Last Year: Never true   Transportation Needs:     Lack of Transportation (Medical):  Lack of Transportation (Non-Medical):    Physical Activity:     Days of Exercise per Week:     Minutes of Exercise per Session:    Stress:     Feeling of Stress :    Social Connections:     Frequency of Communication with Friends and Family:     Frequency of Social Gatherings with Friends and Family:     Attends Religion Services:     Active Member of Clubs or Organizations:     Attends Club or Organization Meetings:     Marital Status:    Intimate Partner Violence:     Fear of Current or Ex-Partner:     Emotionally Abused:     Physically Abused:     Sexually Abused:        Review of Systems  A comprehensive review of systems was negative except for: Musculoskeletal: positive for arthralgias and back pain     Physical Exam   Constitutional: She is oriented to person, place, and time. She appears well-developed and well-nourished. No distress. HENT:   Head: Normocephalic and atraumatic.    Mouth/Throat: Uvula is midline, oropharynx is clear and moist and mucous membranes are normal.   Eyes: Conjunctivae and EOM are normal. Pupils are equal, round, and reactive to light. Neck: Trachea normal and normal range of motion. Neck supple. No JVD present. Carotid bruit is present bilateral. No mass and no thyromegaly present. Cardiovascular: Normal rate, regular rhythm, normal heart sounds and intact distal pulses. Exam reveals no gallop and no friction rub. There is a heart murmur heard. Pulmonary/Chest: Effort normal and breath sounds normal. No respiratory distress. She has no wheezes. She has no rales. Abdominal: Soft. Normal aorta and bowel sounds are normal. She exhibits no distension and no mass. There is no hepatosplenomegaly. No tenderness. Musculoskeletal: She exhibits no edema and no tenderness. Neurological: She is alert and oriented to person, place, and time. She has normal strength. No cranial nerve deficit or sensory deficit. Coordination and gait normal.   Skin: Skin is warm and dry. No rash noted. No erythema. Psychiatric: She has a normal mood and affect. Her behavior is normal.     EKG Interpretation: normal sinus rhythm    Lab Review labs ordered        Assessment:       74 y.o. patient with planned surgery as above. Known risk factors for perioperative complications: Anemia, Asthma, Bleeding concerns- Plavix, Coronary artery disease, Diabetes mellitus, Hypertension, Liver disease (fatty liver), Obstructive sleep apnea, Peripheral vascular disease- leg, kidney, neck, Renal dysfunction  Current medications which may produce withdrawal symptoms if withheld perioperatively: naltrexone      Plan:     1. Preoperative workup as follows: ECG, hemoglobin, hematocrit, electrolytes, creatinine  2. Change in medication regimen before surgery: Take Carvedilol on morning of surgery with sip of water, and hold all other medications until after surgery, use inhaler, Hold Plavix for 5 days before surgery   3. Prophylaxis for cardiac events with perioperative beta-blockers: Currently taking  carvedilol  ACC/AHA indications for pre-operative beta-blocker use:    · Vascular surgery with history of postitive stress test  · Intermediate or high risk surgery with history of CAD   · Intermediate or high risk surgery with multiple clinical predictors of CAD- 2 of the following: history of compensated or prior heart failure, history of cerebrovascular disease, DM, or renal insufficiency    Routine administration of higher-dose, long-acting metoprolol in beta-blockernaïve patients on the day of surgery, and in the absence of dose titration is associated with an overall increase in mortality. Beta-blockers should be started days to weeks prior to surgery and titrated to pulse < 70.  4. Deep vein thrombosis prophylaxis: regimen to be chosen by surgical team  5.  Please review the above to proceed with surgery

## 2021-11-02 DIAGNOSIS — E87.5 HYPERKALEMIA: Primary | ICD-10-CM

## 2021-11-05 ENCOUNTER — HOSPITAL ENCOUNTER (OUTPATIENT)
Age: 66
Setting detail: OUTPATIENT SURGERY
Discharge: HOME | End: 2021-11-05
Attending: SPECIALIST | Admitting: SPECIALIST
Payer: MEDICARE

## 2021-11-05 ENCOUNTER — ANESTHESIA EVENT (OUTPATIENT)
Dept: OPERATING ROOM | Age: 66
End: 2021-11-05
Payer: MEDICARE

## 2021-11-05 ENCOUNTER — ANESTHESIA (OUTPATIENT)
Dept: OPERATING ROOM | Age: 66
End: 2021-11-05
Payer: MEDICARE

## 2021-11-05 ENCOUNTER — APPOINTMENT (OUTPATIENT)
Dept: GENERAL RADIOLOGY | Age: 66
End: 2021-11-05
Attending: SPECIALIST
Payer: MEDICARE

## 2021-11-05 VITALS
SYSTOLIC BLOOD PRESSURE: 146 MMHG | OXYGEN SATURATION: 95 % | DIASTOLIC BLOOD PRESSURE: 60 MMHG | RESPIRATION RATE: 20 BRPM | TEMPERATURE: 98.3 F | HEART RATE: 68 BPM

## 2021-11-05 VITALS — DIASTOLIC BLOOD PRESSURE: 85 MMHG | SYSTOLIC BLOOD PRESSURE: 184 MMHG | OXYGEN SATURATION: 97 %

## 2021-11-05 LAB
GLUCOSE BLD-MCNC: 180 MG/DL (ref 60–115)
GLUCOSE BLD-MCNC: 194 MG/DL (ref 60–115)
PERFORMED ON: ABNORMAL
PERFORMED ON: ABNORMAL

## 2021-11-05 PROCEDURE — 7100000000 HC PACU RECOVERY - FIRST 15 MIN: Performed by: SPECIALIST

## 2021-11-05 PROCEDURE — 7100000001 HC PACU RECOVERY - ADDTL 15 MIN: Performed by: SPECIALIST

## 2021-11-05 PROCEDURE — 3700000000 HC ANESTHESIA ATTENDED CARE: Performed by: SPECIALIST

## 2021-11-05 PROCEDURE — 6360000002 HC RX W HCPCS: Performed by: NURSE ANESTHETIST, CERTIFIED REGISTERED

## 2021-11-05 PROCEDURE — 7100000011 HC PHASE II RECOVERY - ADDTL 15 MIN: Performed by: SPECIALIST

## 2021-11-05 PROCEDURE — 3600000012 HC SURGERY LEVEL 2 ADDTL 15MIN: Performed by: SPECIALIST

## 2021-11-05 PROCEDURE — 6360000002 HC RX W HCPCS: Performed by: STUDENT IN AN ORGANIZED HEALTH CARE EDUCATION/TRAINING PROGRAM

## 2021-11-05 PROCEDURE — 6360000002 HC RX W HCPCS: Performed by: SPECIALIST

## 2021-11-05 PROCEDURE — 2500000003 HC RX 250 WO HCPCS: Performed by: NURSE ANESTHETIST, CERTIFIED REGISTERED

## 2021-11-05 PROCEDURE — 3700000001 HC ADD 15 MINUTES (ANESTHESIA): Performed by: SPECIALIST

## 2021-11-05 PROCEDURE — 2709999900 HC NON-CHARGEABLE SUPPLY: Performed by: SPECIALIST

## 2021-11-05 PROCEDURE — 2580000003 HC RX 258

## 2021-11-05 PROCEDURE — 3209999900 FLUORO FOR SURGICAL PROCEDURES

## 2021-11-05 PROCEDURE — 2580000003 HC RX 258: Performed by: SPECIALIST

## 2021-11-05 PROCEDURE — 2500000003 HC RX 250 WO HCPCS: Performed by: SPECIALIST

## 2021-11-05 PROCEDURE — 7100000010 HC PHASE II RECOVERY - FIRST 15 MIN: Performed by: SPECIALIST

## 2021-11-05 PROCEDURE — 6370000000 HC RX 637 (ALT 250 FOR IP): Performed by: STUDENT IN AN ORGANIZED HEALTH CARE EDUCATION/TRAINING PROGRAM

## 2021-11-05 PROCEDURE — 2580000003 HC RX 258: Performed by: NURSE ANESTHETIST, CERTIFIED REGISTERED

## 2021-11-05 PROCEDURE — 3600000002 HC SURGERY LEVEL 2 BASE: Performed by: SPECIALIST

## 2021-11-05 RX ORDER — LIDOCAINE HYDROCHLORIDE 10 MG/ML
1 INJECTION, SOLUTION EPIDURAL; INFILTRATION; INTRACAUDAL; PERINEURAL
Status: DISCONTINUED | OUTPATIENT
Start: 2021-11-05 | End: 2021-11-05 | Stop reason: HOSPADM

## 2021-11-05 RX ORDER — LIDOCAINE HYDROCHLORIDE 10 MG/ML
INJECTION, SOLUTION EPIDURAL; INFILTRATION; INTRACAUDAL; PERINEURAL PRN
Status: DISCONTINUED | OUTPATIENT
Start: 2021-11-05 | End: 2021-11-05 | Stop reason: SDUPTHER

## 2021-11-05 RX ORDER — MEPERIDINE HYDROCHLORIDE 25 MG/ML
12.5 INJECTION INTRAMUSCULAR; INTRAVENOUS; SUBCUTANEOUS EVERY 5 MIN PRN
Status: DISCONTINUED | OUTPATIENT
Start: 2021-11-05 | End: 2021-11-05 | Stop reason: HOSPADM

## 2021-11-05 RX ORDER — ONDANSETRON 2 MG/ML
INJECTION INTRAMUSCULAR; INTRAVENOUS PRN
Status: DISCONTINUED | OUTPATIENT
Start: 2021-11-05 | End: 2021-11-05 | Stop reason: SDUPTHER

## 2021-11-05 RX ORDER — PROPOFOL 10 MG/ML
INJECTION, EMULSION INTRAVENOUS PRN
Status: DISCONTINUED | OUTPATIENT
Start: 2021-11-05 | End: 2021-11-05 | Stop reason: SDUPTHER

## 2021-11-05 RX ORDER — ROCURONIUM BROMIDE 10 MG/ML
INJECTION, SOLUTION INTRAVENOUS PRN
Status: DISCONTINUED | OUTPATIENT
Start: 2021-11-05 | End: 2021-11-05 | Stop reason: SDUPTHER

## 2021-11-05 RX ORDER — OXYCODONE HYDROCHLORIDE AND ACETAMINOPHEN 5; 325 MG/1; MG/1
2 TABLET ORAL ONCE
Status: COMPLETED | OUTPATIENT
Start: 2021-11-05 | End: 2021-11-05

## 2021-11-05 RX ORDER — MAGNESIUM HYDROXIDE 1200 MG/15ML
LIQUID ORAL CONTINUOUS PRN
Status: COMPLETED | OUTPATIENT
Start: 2021-11-05 | End: 2021-11-05

## 2021-11-05 RX ORDER — SODIUM CHLORIDE, SODIUM LACTATE, POTASSIUM CHLORIDE, CALCIUM CHLORIDE 600; 310; 30; 20 MG/100ML; MG/100ML; MG/100ML; MG/100ML
INJECTION, SOLUTION INTRAVENOUS
Status: COMPLETED
Start: 2021-11-05 | End: 2021-11-05

## 2021-11-05 RX ORDER — FENTANYL CITRATE 50 UG/ML
INJECTION, SOLUTION INTRAMUSCULAR; INTRAVENOUS PRN
Status: DISCONTINUED | OUTPATIENT
Start: 2021-11-05 | End: 2021-11-05 | Stop reason: SDUPTHER

## 2021-11-05 RX ORDER — DIPHENHYDRAMINE HYDROCHLORIDE 50 MG/ML
12.5 INJECTION INTRAMUSCULAR; INTRAVENOUS
Status: DISCONTINUED | OUTPATIENT
Start: 2021-11-05 | End: 2021-11-05 | Stop reason: HOSPADM

## 2021-11-05 RX ORDER — SODIUM CHLORIDE 0.9 % (FLUSH) 0.9 %
10 SYRINGE (ML) INJECTION PRN
Status: DISCONTINUED | OUTPATIENT
Start: 2021-11-05 | End: 2021-11-05 | Stop reason: HOSPADM

## 2021-11-05 RX ORDER — FENTANYL CITRATE 50 UG/ML
50 INJECTION, SOLUTION INTRAMUSCULAR; INTRAVENOUS EVERY 10 MIN PRN
Status: DISCONTINUED | OUTPATIENT
Start: 2021-11-05 | End: 2021-11-05 | Stop reason: HOSPADM

## 2021-11-05 RX ORDER — SODIUM CHLORIDE, SODIUM LACTATE, POTASSIUM CHLORIDE, CALCIUM CHLORIDE 600; 310; 30; 20 MG/100ML; MG/100ML; MG/100ML; MG/100ML
INJECTION, SOLUTION INTRAVENOUS CONTINUOUS
Status: DISCONTINUED | OUTPATIENT
Start: 2021-11-05 | End: 2021-11-05 | Stop reason: HOSPADM

## 2021-11-05 RX ORDER — SODIUM CHLORIDE, SODIUM LACTATE, POTASSIUM CHLORIDE, CALCIUM CHLORIDE 600; 310; 30; 20 MG/100ML; MG/100ML; MG/100ML; MG/100ML
INJECTION, SOLUTION INTRAVENOUS CONTINUOUS PRN
Status: DISCONTINUED | OUTPATIENT
Start: 2021-11-05 | End: 2021-11-05 | Stop reason: SDUPTHER

## 2021-11-05 RX ORDER — SODIUM CHLORIDE 0.9 % (FLUSH) 0.9 %
10 SYRINGE (ML) INJECTION EVERY 12 HOURS SCHEDULED
Status: DISCONTINUED | OUTPATIENT
Start: 2021-11-05 | End: 2021-11-05 | Stop reason: HOSPADM

## 2021-11-05 RX ORDER — ONDANSETRON 2 MG/ML
4 INJECTION INTRAMUSCULAR; INTRAVENOUS
Status: DISCONTINUED | OUTPATIENT
Start: 2021-11-05 | End: 2021-11-05 | Stop reason: HOSPADM

## 2021-11-05 RX ORDER — SODIUM CHLORIDE 9 MG/ML
25 INJECTION, SOLUTION INTRAVENOUS PRN
Status: DISCONTINUED | OUTPATIENT
Start: 2021-11-05 | End: 2021-11-05 | Stop reason: HOSPADM

## 2021-11-05 RX ADMIN — FENTANYL CITRATE 100 MCG: 50 INJECTION, SOLUTION INTRAMUSCULAR; INTRAVENOUS at 13:17

## 2021-11-05 RX ADMIN — FENTANYL CITRATE 50 MCG: 50 INJECTION, SOLUTION INTRAMUSCULAR; INTRAVENOUS at 16:07

## 2021-11-05 RX ADMIN — FENTANYL CITRATE 50 MCG: 50 INJECTION, SOLUTION INTRAMUSCULAR; INTRAVENOUS at 15:56

## 2021-11-05 ASSESSMENT — PULMONARY FUNCTION TESTS
PIF_VALUE: 28
PIF_VALUE: 3
PIF_VALUE: 1
PIF_VALUE: 29
PIF_VALUE: 28
PIF_VALUE: 30
PIF_VALUE: 30
PIF_VALUE: 28
PIF_VALUE: 3
PIF_VALUE: 3
PIF_VALUE: 32
PIF_VALUE: 30
PIF_VALUE: 30
PIF_VALUE: 29
PIF_VALUE: 31
PIF_VALUE: 29
PIF_VALUE: 28
PIF_VALUE: 27
PIF_VALUE: 28
PIF_VALUE: 31
PIF_VALUE: 31
PIF_VALUE: 29
PIF_VALUE: 34
PIF_VALUE: 27
PIF_VALUE: 31
PIF_VALUE: 29
PIF_VALUE: 28
PIF_VALUE: 30
PIF_VALUE: 27
PIF_VALUE: 32
PIF_VALUE: 30
PIF_VALUE: 28
PIF_VALUE: 29
PIF_VALUE: 28
PIF_VALUE: 1
PIF_VALUE: 28
PIF_VALUE: 28
PIF_VALUE: 31
PIF_VALUE: 29
PIF_VALUE: 28
PIF_VALUE: 30
PIF_VALUE: 25
PIF_VALUE: 29
PIF_VALUE: 28
PIF_VALUE: 32
PIF_VALUE: 27
PIF_VALUE: 30
PIF_VALUE: 27
PIF_VALUE: 29
PIF_VALUE: 30
PIF_VALUE: 30
PIF_VALUE: 28
PIF_VALUE: 2
PIF_VALUE: 28
PIF_VALUE: 29
PIF_VALUE: 27
PIF_VALUE: 28
PIF_VALUE: 30
PIF_VALUE: 29
PIF_VALUE: 31
PIF_VALUE: 35
PIF_VALUE: 31
PIF_VALUE: 27
PIF_VALUE: 30
PIF_VALUE: 30
PIF_VALUE: 28
PIF_VALUE: 30
PIF_VALUE: 3
PIF_VALUE: 30
PIF_VALUE: 30
PIF_VALUE: 28
PIF_VALUE: 29
PIF_VALUE: 29
PIF_VALUE: 0
PIF_VALUE: 30
PIF_VALUE: 28
PIF_VALUE: 29
PIF_VALUE: 27
PIF_VALUE: 31
PIF_VALUE: 30
PIF_VALUE: 28
PIF_VALUE: 29
PIF_VALUE: 29
PIF_VALUE: 31
PIF_VALUE: 28
PIF_VALUE: 30
PIF_VALUE: 2
PIF_VALUE: 28
PIF_VALUE: 3
PIF_VALUE: 29
PIF_VALUE: 31
PIF_VALUE: 29
PIF_VALUE: 31
PIF_VALUE: 2
PIF_VALUE: 28
PIF_VALUE: 31
PIF_VALUE: 28
PIF_VALUE: 31
PIF_VALUE: 32
PIF_VALUE: 31
PIF_VALUE: 29
PIF_VALUE: 30
PIF_VALUE: 30
PIF_VALUE: 29
PIF_VALUE: 33
PIF_VALUE: 29
PIF_VALUE: 28
PIF_VALUE: 28
PIF_VALUE: 29
PIF_VALUE: 28
PIF_VALUE: 30
PIF_VALUE: 30
PIF_VALUE: 28
PIF_VALUE: 29
PIF_VALUE: 31
PIF_VALUE: 0
PIF_VALUE: 29
PIF_VALUE: 28
PIF_VALUE: 28
PIF_VALUE: 29
PIF_VALUE: 30
PIF_VALUE: 28
PIF_VALUE: 30
PIF_VALUE: 29
PIF_VALUE: 27
PIF_VALUE: 28
PIF_VALUE: 29
PIF_VALUE: 30
PIF_VALUE: 29

## 2021-11-05 ASSESSMENT — PAIN SCALES - GENERAL
PAINLEVEL_OUTOF10: 7
PAINLEVEL_OUTOF10: 5
PAINLEVEL_OUTOF10: 7

## 2021-11-05 ASSESSMENT — PAIN - FUNCTIONAL ASSESSMENT: PAIN_FUNCTIONAL_ASSESSMENT: 0-10

## 2021-11-05 ASSESSMENT — PAIN DESCRIPTION - DESCRIPTORS: DESCRIPTORS: ACHING;DULL

## 2021-11-05 NOTE — ANESTHESIA PRE PROCEDURE
Department of Anesthesiology  Preprocedure Note       Name:  Traci Torrez   Age:  74 y.o.  :  1955                                          MRN:  [id number]         Date:  2021      Surgeon: Elsa Nance):  Di Saxena MD    Procedure: Procedure(s):  REMOVAL OF SPINAL CORD STIMULATOR GENERATOR AND LEADS (PAT AT [facility] ) 2:00PM ABBOTT    Medications prior to admission:   Prior to Admission medications    Medication Sig Start Date End Date Taking?  Authorizing Provider   NALTREXONE HCL PO Take 4.5 mg by mouth daily   Yes Historical Provider, MD   vitamin D (ERGOCALCIFEROL) 1.25 MG (66672 UT) CAPS capsule TAKE 1 CAPSULE BY MOUTH 1 TIME A WEEK 21  Yes Historical Provider, MD   TRINTELLIX 10 MG TABS tablet 10 mg daily  21  Yes Historical Provider, MD   pregabalin (LYRICA) 75 MG capsule TAKE 1 CAPSULE BY MOUTH TWICE DAILY 3/15/21  Yes Historical Provider, MD   lidocaine (LIDODERM) 5 % Place 2 patches onto the skin 2 times daily  21  Yes Historical Provider, MD   cloNIDine (CATAPRES) 0.1 MG tablet Take 0.1 mg by mouth as needed Indications: Has taken twice in the last year If BP >170 20  Yes Historical Provider, MD   doxazosin (CARDURA) 2 MG tablet Take 2 mg by mouth 2 times daily    Yes Historical Provider, MD   baclofen (LIORESAL) 10 MG tablet Take 10 mg by mouth 3 times daily as needed    Yes Historical Provider, MD   carvedilol (COREG) 25 MG tablet TK 1 T PO BID 19  Yes Historical Provider, MD   dicyclomine (BENTYL) 20 MG tablet Take by mouth   Yes Historical Provider, MD   montelukast (SINGULAIR) 10 MG tablet TAKE 1 TABLET BY MOUTH DAILY AT BEDTIME 10/4/17  Yes Historical Provider, MD   linagliptin (TRADJENTA) 5 MG tablet Take 5 mg by mouth daily    Yes Historical Provider, MD   levothyroxine (SYNTHROID) 88 MCG tablet Take 88 mcg by mouth Daily  13  Yes Historical Provider, MD   irbesartan (AVAPRO) 300 MG tablet Take 150 mg by mouth 2 times daily  17  Yes Historical Provider, MD   DULoxetine (CYMBALTA) 60 MG extended release capsule Take 60 mg by mouth daily  9/1/17  Yes Historical Provider, MD   insulin regular human (HUMULIN R U-500) 500 UNIT/ML concentrated injection vial 20 Units 2 times daily (with meals)  6/1/17  Yes Historical Provider, MD   chlorthalidone (HYGROTON) 25 MG tablet Take 25 mg by mouth 7/26/17  Yes Historical Provider, MD   albuterol sulfate  (90 Base) MCG/ACT inhaler Inhale 2 puffs into the lungs every 6 hours as needed for Wheezing   Yes Historical Provider, MD   clopidogrel (PLAVIX) 75 MG tablet Take by mouth 7/23/20   Historical Provider, MD   CONTOUR NEXT TEST strip USE 4 STRIPS DAILY 9/16/20   Historical Provider, MD   betamethasone dipropionate (DIPROLENE) 0.05 % cream Indications: Eczema PRN    Historical Provider, MD   cetirizine (ZYRTEC ALLERGY) 10 MG tablet Take 10 mg by mouth 6/10/15   Historical Provider, MD   naloxone (NARCAN) 4 MG/0.1ML LIQD nasal spray Narcan 4 mg/actuation nasal spray    Historical Provider, MD   nitroGLYCERIN (NITROSTAT) 0.3 MG SL tablet DISOLVE 1 TABLET UNDER THE TONGUE AS NEEDED FOR CHEST PAIN. IF NO REFLIEF CALL 911 2/24/17   Historical Provider, MD   Evolocumab 140 MG/ML SOAJ Inject 140 mg into the skin every 14 days Indications: High Amount of Fats in the Blood  10/6/17   Historical Provider, MD   diclofenac sodium 1 % GEL apply 2gms to areas of joint pain up to four times a day. Avoid contact with eyes. Do not exceed 32gm/day 2/23/16   Historical Provider, MD       Current medications:    Current Facility-Administered Medications   Medication Dose Route Frequency Provider Last Rate Last Admin    vancomycin 1000 mg IVPB in 250 mL D5W addavial  1,000 mg IntraVENous On Call to MD Douglas           Allergies:     Allergies   Allergen Reactions    Aspirin Swelling    Sulfa Antibiotics Hives     Bactrim    Ace Inhibitors      vasotec    Aripiprazole     Atenolol     Benzodiazepines  Pain in joint, multiple sites M25.50    Renal artery stenosis ([facility]) I70.1    Renovascular hypertension I15.0    Statin intolerance Z78.9    Subclavian arterial stenosis ([facility]) I77.1    Type 2 diabetes mellitus with moderate nonproliferative diabetic retinopathy and without macular edema ([facility]) U14.7289    Diabetic neuropathy ([facility]) E11.40    Diabetes mellitus ([facility]) E11.9    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    Vitamin D deficiency E55.9    Chronic bilateral low back pain without sciatica M54.50, G89.29    Heart murmur R01.1    Hiatal hernia K44.9    Kidney disease N28.9    Failed back syndrome M96.1    Chronic migraine without aura without status migrainosus, not intractable G43.709    Other seborrheic keratosis L82.1    Acquired hypothyroidism E03.9    Anemia, unspecified D64.9    Astigmatism with presbyopia H52.209, H52.4    Benign nevus of skin D22.9    Degeneration of cervical intervertebral disc M50.30    Dermatochalasis H02.839    Diverticulitis of sigmoid colon K57.32    Hypomagnesemia E83.42    Ingrowing nail L60.0    Internal carotid artery stenosis, bilateral I65.23    Joint stiffness of multiple sites M25.60    Leg pain, bilateral M79.604, M79.605    Viral warts B07.9    Mild nonproliferative diabetic retinopathy associated with type 2 diabetes mellitus ([facility]) E11.3299    Milia L72.0    Muscle spasms of both lower extremities M62.838    Obesity, Class III, BMI 40-49.9 (morbid obesity) ([facility]) E66.01    Obstructive sleep apnea (adult) (pediatric) G47.33    Paronychia of toe L03.039    Other pain disorders related to psychological factors F45.42    Pain in toe M79.676    Poor peripheral circulation R09.89    Postmenopausal atrophic vaginitis N95.2    Preglaucoma, unspecified H40.009    Presbyopia H52.4    Pruritus L29.9    Rash and other nonspecific skin eruption R21    Secondary osteoarthritis of multiple sites M15.3    Senile nuclear sclerosis H25.10    Failed neck syndrome M96.1    Asthma J45.909    Centrilobular emphysema (HCC) J43.2    CKD (chronic kidney disease) stage 3, GFR 30-59 ml/min ([facility]) N18.30    Digital mucous cyst of finger of right hand M67.441    H/O: pneumonia Z87.01    History of arthritis Z87.39    History of kidney disease Z80.12    History of peripheral vascular disease Z86.79    Pain in finger of both hands M79.645, M79.644    Peripheral angiopathy due to type 2 diabetes mellitus ([facility]) E11.51    Scar irritation L90.5    Thyroid nodule E04.1    Diabetic polyneuropathy associated with type 2 diabetes mellitus ([facility]) E11.42    Herpes zoster B02.9    Occlusion and stenosis of unspecified carotid artery I65.29    Encounter for screening mammogram for malignant neoplasm of breast Z12.31    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    Former smoker Z87.891    Degenerative arthritis of distal interphalangeal joint of index finger of right hand M15.1       Past Medical History:        Diagnosis Date    Allergic rhinitis     Carotid artery stenosis     Cystocele with prolapse     Diabetes mellitus ([facility] [address]     hx since 10/1992    Hiatal hernia     Hyperlipidemia     past meds / now on Repatha    Hypertension     meds > 68 yrs / denies TIA or stroke    Inflammatory arthritis 11/20/2012    Macular edema     Neurogenic claudication ([facility])     Neuropathy     AMANDEEP (obstructive sleep apnea)     DOES NOT USE CPAP    Osteoarthritis     Peripheral vascular disease (HCC)     PVD (peripheral vascular disease) ([facility])     2 stents left thigh    Renal artery occlusion ([facility])     stents of right    Renal insufficiency     Thyroid disease     meds > 4 yrs       Past Surgical History:        Procedure Laterality Date    BACK SURGERY      C 3-4 fusion    BACK SURGERY      L 3-4-5 laminectomy    CARPAL TUNNEL RELEASE Bilateral     CERVICAL SPINE SURGERY      CHOLECYSTECTOMY      COLONOSCOPY      CYSTOCELE REPAIR      ENDOSCOPY, COLON, DIAGNOSTIC      FEMORAL BYPASS       left thigh stent     HYSTERECTOMY      IMPLANTATION VAGAL NERVE STIMULATOR N/A 10/30/2017    SPINAL CORD STIMULATOR IMPLANT ([facility] Goel) performed by Richard Qureshi MD at [facility] 598      renal stent placement right    LUMBAR SPINE SURGERY      NOSE SURGERY      DNS repair    RECTOCELE REPAIR      TONSILLECTOMY AND ADENOIDECTOMY         Social History:    Social History     Tobacco Use    Smoking status: Former Smoker     Packs/day: 1.00     Years: 45.00     Pack years: 45.00     Types: Cigarettes     Quit date: 2010     Years since quittin.4    Smokeless tobacco: Never Used   Substance Use Topics    Alcohol use:  No                                Counseling given: Not Answered      Vital Signs (Current):   Vitals:    21 1037   BP: (!) 183/74   Pulse: 68   Resp: 16   Temp: 97.7 °F (36.5 °C)   TempSrc: Temporal   SpO2: 94%                                              BP Readings from Last 3 Encounters:   21 (!) 183/74   21 118/78   10/29/21 120/74       NPO Status: Time of last liquid consumption:                         Time of last solid consumption:                         Date of last liquid consumption: 21                        Date of last solid food consumption: 21    BMI:   Wt Readings from Last 3 Encounters:   21 275 lb (124.7 kg)   10/29/21 275 lb (124.7 kg)   21 276 lb (125.2 kg)     There is no height or weight on file to calculate BMI.    CBC:   Lab Results   Component Value Date    WBC 14.5 2021    RBC 3.94 2021    HGB 10.0 2021    HCT 31.3 2021    MCV 79.5 2021    RDW 17.0 2021     2021       CMP:   Lab Results   Component Value Date     2021    K 5.5 2021     2021    CO2 20 2021    BUN 44 2021    CREATININE 1.34 2021    GFRAA 47.6 2021    LABGLOM 39.3 2021 GLUCOSE 82 11/01/2021    GLUCOSE 262 01/08/2020    PROT 7.2 01/08/2020    CALCIUM 9.0 11/01/2021    BILITOT 0.5 01/08/2020    ALKPHOS 87 01/08/2020    AST 9 01/08/2020    ALT 10 01/08/2020       POC Tests:   Recent Labs     11/05/21  1126   POCGLU 194*       Coags: No results found for: PROTIME, INR, APTT    HCG (If Applicable): No results found for: PREGTESTUR, PREGSERUM, HCG, HCGQUANT     ABGs: No results found for: PHART, PO2ART, LMN9JTA, MWJ9OPF, BEART, K7XUOQQY     Type & Screen (If Applicable):  No results found for: LABABO, LABRH    Drug/Infectious Status (If Applicable):  No results found for: HIV, HEPCAB    COVID-19 Screening (If Applicable): No results found for: COVID19        Anesthesia Evaluation  Patient summary reviewed and Nursing notes reviewed no history of anesthetic complications:   Airway: Mallampati: II  TM distance: >3 FB   Neck ROM: full  Mouth opening: > = 3 FB Dental: normal exam         Pulmonary:normal exam    (+) COPD:  sleep apnea:  asthma:                            Cardiovascular:  Exercise tolerance: good (>4 METS),   (+) hypertension:, CAD:,       ECG reviewed               Beta Blocker:  Not on Beta Blocker         Neuro/Psych:   (+) neuromuscular disease:, headaches:, psychiatric history:            GI/Hepatic/Renal:   (+) hiatal hernia, morbid obesity         ROS comment: BMI 45. Endo/Other:    (+) DiabetesType II DM, , hypothyroidism, blood dyscrasia: anemia, arthritis:., electrolyte abnormalities, . Pt had PAT visit. Abdominal:             Vascular:   + PVD, aortic or cerebral, . Other Findings:             Anesthesia Plan      general     ASA 3     (ETT)  Induction: intravenous. MIPS: Postoperative opioids intended and Prophylactic antiemetics administered. Anesthetic plan and risks discussed with patient. Plan discussed with CRNA.     Attending anesthesiologist reviewed and agrees with Pre Eval content              [facility], DO 11/5/2021

## 2021-11-05 NOTE — ANESTHESIA POSTPROCEDURE EVALUATION
Department of Anesthesiology  Postprocedure Note    Patient: Valarie Sheth  MRN: [id number]  YOB: 1955  Date of evaluation: 11/5/2021  Time:  3:47 PM     Procedure Summary     Date: 11/05/21 Room / Location: [address] [place] [address]    Anesthesia Start: 7168 Anesthesia Stop: 1265    Procedure: REMOVAL OF SPINAL CORD STIMULATOR GENERATOR AND LEADS (N/A ) Diagnosis: (CHRONIC PAIN SYNDROME)    Surgeons: Jp Saravia MD Responsible Provider: Richard Ragsdale MD    Anesthesia Type: general ASA Status: 3          Anesthesia Type: general    Omar Phase I: Omar Score: 10    Omar Phase II:      Last vitals: Reviewed and per EMR flowsheets.        Anesthesia Post Evaluation    Patient location during evaluation: bedside  Patient participation: complete - patient participated  Level of consciousness: awake  Airway patency: patent  Nausea & Vomiting: no nausea and no vomiting  Complications: no  Cardiovascular status: blood pressure returned to baseline  Respiratory status: acceptable  Hydration status: euvolemic

## 2021-11-05 NOTE — OP NOTE
Operative Note      Patient: Kelby Real  YOB: 1955  MRN: [id number]    Date of Procedure: 11/5/2021    Pre-Op Diagnosis: Degenerative disc disease  CHRONIC PAIN SYNDROME    Post-Op Diagnosis: Same       Procedure(s):  REMOVAL OF SPINAL CORD STIMULATOR GENERATOR AND LEADS    Surgeon(s):  MD Nestor Mcelroy MD    Assistant:   First Assistant: Sophie Delatorre; April Knock    Anesthesia: Monitor Anesthesia Care    Estimated Blood Loss (mL): Minimal    Complications: None    Specimens:   * No specimens in log *    Implants:  * No implants in log *      Drains: * No LDAs found *    Findings: see the op note     Detailed Description of Procedure:   Dictated.     Electronically signed by Nestor Street MD on 11/5/2021 at 3:25 PM

## 2021-11-05 NOTE — BRIEF OP NOTE
Brief Postoperative Note      Patient: Alexey Marino  YOB: 1955  MRN: [id number]    Date of Procedure: 11/5/2021    Pre-Op Diagnosis:Degenerative disc disease  CHRONIC PAIN SYNDROME    Post-Op Diagnosis: Same       Procedure(s):  REMOVAL OF SPINAL CORD STIMULATOR GENERATOR AND LEADS    Surgeon(s):  MD Grisel Whitmore MD    Assistant:  First Assistant: Oscar Morocho    Anesthesia: Monitor Anesthesia Care    Estimated Blood Loss (mL): Minimal    Complications: None    Specimens:   * No specimens in log *    Implants:  * No implants in log *      Drains: * No LDAs found *    Findings: see the op note     Electronically signed by Grisel Bejarano MD on 11/5/2021 at 3:24 PM

## 2021-11-06 NOTE — OP NOTE
[address]                      [address], [address] [facility]                                OPERATIVE REPORT    PATIENT NAME: Carolyn [place]                   :        1955  MED REC NO:   [id number]                            ROOM:  ACCOUNT NO:   [de-identified]                           ADMIT DATE: 2021  PROVIDER:     Saima Velazquez MD    DATE OF PROCEDURE:  2021    PREOPERATIVE DIAGNOSIS:  Failure spinal cord stimulator leads and  generator. POSTOPERATIVE DIAGNOSIS:  Failure spinal cord stimulator leads and  generator. OPERATION PERFORMED:  Retrieve failed spinal cord stimulator wire. SURGEON:  Juan Luis Jimenez MD    INDICATIONS:  Fractured spinal cord stimulator lead. OPERATIVE PROCEDURE:  Dr. Clarisse Jimenez had proceeded with surgery to remove the  failed spinal cord stimulator and generator. It became apparent that  one of the leads had fractured and migrated superiorly. I was requested  to assist in retrieving the fractured wire that was just posterior to  the lamina. Dr. Clarisse Jimenez requested my assistance intraoperatively. I scrubbed into the procedure and was able to do a subperiosteal  dissection to the left side of the spinous process where we could see  the fractured wire on the AP x-ray. I placed a small retractor in  there. The wire became readily visible and I was able to grab the  fractured lead and remove it completely. I then placed a few 0 Vicryl  sutures in the fascia. Dr. Clarisse Jimenez proceeded with the rest of the  procedure. Please see his dictation.         Bria Ledezma MD    D: 2021 15:32:18       T: 2021 15:35:45     [email or web address]  Job#: [id number]     Doc#: [id number]    CC:

## 2021-11-06 NOTE — OP NOTE
[address]                      [address], [address] [facility]                                OPERATIVE REPORT    PATIENT NAME: Stanislav Alberto                   :        1955  MED REC NO:   [id number]                            ROOM:  ACCOUNT NO:   [de-identified]                           ADMIT DATE: 2021  PROVIDER:     Sánchez Londono MD    DATE OF PROCEDURE:  2021    PREOPERATIVE DIAGNOSIS:  Degenerative disc disease of lumbar spine. POSTOPERATIVE DIAGNOSES:  Degenerative disc disease of lumbar spine and  fracture of the leads. OPERATION PERFORMED:  Removal of spinal cord stimulator battery and  leads. SURGEONS:  Juan Luis Abdul MD and James Welch MD, neurosurgeon. SCRUB:  _____. ANESTHESIA:  General.    BLEEDING:  Minimal.    COMPLICATIONS:  None. INDICATIONS:  This patient has spinal cord stimulator for chronic back  pain, degenerative disc disease of lumbar spine. She had spinal cord  stimulator device implanted a few years ago and was working well for  her, but lately she had frequent fall and one big fall where she started  having problem with the device, and we were not able to program the  device because of high impedances. On previous fluoroscopic examination  at my office, we had seen that might be a possible fracture of the lead. Therefore, this procedure was undertaken to remove everything. OPERATIVE PROCEDURE:  The patient was brought to the operating room,  general anesthesia was started. The patient was then turned in a prone  position. All the pressure points were properly secured. Back was  thoroughly cleaned and draped in aseptic technique. Local anesthetic,  which is a combination of 1% lidocaine with 0.5% Marcaine was injected  over the left hip area where she had a battery implanted.   Then with a  knife, incision was made over the previous incision and dissection was  done, battery was found, was disconnected from the leads with a  screwdriver and battery was taken out. Now, attention was given to the  lower back area plus fluoroscopic guidance was used to locate both  anchors. Then, local anesthetic was injected and incision was done. Then, dissection was done. All the bleeding points were cauterized. Then, we found the leads and we found both anchors. One lead and one  anchor came out without any problem. While we were removing the second  anchor, we knew that it was disconnected or broken off from the lead  that was proximal to it. So, attempts were made to find distal end with  fluoroscopy as well as with deep dissection. We thought that we are  almost at the paraspinal fascia, so I called Dr. Mei Lehman, the  neurosurgeon, to help me. He had done further deep dissection using his  instruments and then with fluoroscopic guidance, he located the lead  that was proximal to T12 level. It was still outside the epidural  space, but on just above the lamina. So, he grabbed with his instrument  and removed the distal lead intact. Then, he closed the deep layers  with 0 Vicryl and 2-0 Vicryl and then we closed remaining of the area  with 3-0 and 4-0 Vicryl as well as subcuticular area. Glue was applied  on both areas over the left hip and over the midline. Dressings were  applied. The patient was taken to the recovery room and was discharged  in satisfactory condition.         Kathleen Coy MD    D: 11/05/2021 15:44:41       T: 11/05/2021 22:48:42     [email or web address]  Job#: [id number]     Doc#: [id number]    CC:

## 2021-11-25 PROBLEM — Z12.31 ENCOUNTER FOR SCREENING MAMMOGRAM FOR MALIGNANT NEOPLASM OF BREAST: Status: RESOLVED | Noted: 2018-04-11 | Resolved: 2021-11-25

## 2022-06-03 ENCOUNTER — OFFICE VISIT (OUTPATIENT)
Dept: NEUROLOGY | Age: 67
End: 2022-06-03
Payer: MEDICARE

## 2022-06-03 VITALS
WEIGHT: 263 LBS | BODY MASS INDEX: 42.27 KG/M2 | DIASTOLIC BLOOD PRESSURE: 80 MMHG | HEART RATE: 72 BPM | HEIGHT: 66 IN | SYSTOLIC BLOOD PRESSURE: 130 MMHG

## 2022-06-03 DIAGNOSIS — M48.062 SPINAL STENOSIS OF LUMBAR REGION WITH NEUROGENIC CLAUDICATION: Primary | ICD-10-CM

## 2022-06-03 DIAGNOSIS — G63 POLYNEUROPATHY ASSOCIATED WITH UNDERLYING DISEASE (HCC): ICD-10-CM

## 2022-06-03 DIAGNOSIS — M96.1 FAILED NECK SYNDROME: ICD-10-CM

## 2022-06-03 DIAGNOSIS — M96.1 FAILED BACK SURGICAL SYNDROME: ICD-10-CM

## 2022-06-03 DIAGNOSIS — E66.01 OBESITY, CLASS III, BMI 40-49.9 (MORBID OBESITY) (HCC): ICD-10-CM

## 2022-06-03 DIAGNOSIS — M48.02 CERVICAL STENOSIS OF SPINE: ICD-10-CM

## 2022-06-03 DIAGNOSIS — G95.19 NEUROGENIC CLAUDICATION (HCC): ICD-10-CM

## 2022-06-03 PROBLEM — N88.2 CERVICAL OS STENOSIS: Status: ACTIVE | Noted: 2022-06-03

## 2022-06-03 PROCEDURE — G8417 CALC BMI ABV UP PARAM F/U: HCPCS | Performed by: PSYCHIATRY & NEUROLOGY

## 2022-06-03 PROCEDURE — 3017F COLORECTAL CA SCREEN DOC REV: CPT | Performed by: PSYCHIATRY & NEUROLOGY

## 2022-06-03 PROCEDURE — 1123F ACP DISCUSS/DSCN MKR DOCD: CPT | Performed by: PSYCHIATRY & NEUROLOGY

## 2022-06-03 PROCEDURE — 1090F PRES/ABSN URINE INCON ASSESS: CPT | Performed by: PSYCHIATRY & NEUROLOGY

## 2022-06-03 PROCEDURE — 1036F TOBACCO NON-USER: CPT | Performed by: PSYCHIATRY & NEUROLOGY

## 2022-06-03 PROCEDURE — G8427 DOCREV CUR MEDS BY ELIG CLIN: HCPCS | Performed by: PSYCHIATRY & NEUROLOGY

## 2022-06-03 PROCEDURE — G8400 PT W/DXA NO RESULTS DOC: HCPCS | Performed by: PSYCHIATRY & NEUROLOGY

## 2022-06-03 PROCEDURE — 99214 OFFICE O/P EST MOD 30 MIN: CPT | Performed by: PSYCHIATRY & NEUROLOGY

## 2022-06-03 RX ORDER — OXYCODONE HYDROCHLORIDE AND ACETAMINOPHEN 5; 325 MG/1; MG/1
TABLET ORAL
COMMUNITY
Start: 2022-04-14

## 2022-06-03 RX ORDER — PREGABALIN 100 MG/1
CAPSULE ORAL
COMMUNITY
Start: 2022-05-11

## 2022-06-03 ASSESSMENT — ENCOUNTER SYMPTOMS
NAUSEA: 0
COLOR CHANGE: 0
TROUBLE SWALLOWING: 0
BACK PAIN: 1
CHOKING: 0
VOMITING: 0
SHORTNESS OF BREATH: 0
PHOTOPHOBIA: 0

## 2022-06-03 NOTE — PROGRESS NOTES
REPAIR      ENDOSCOPY, COLON, DIAGNOSTIC      FEMORAL BYPASS       left thigh stent     HYSTERECTOMY      IMPLANTATION VAGAL NERVE STIMULATOR N/A 10/30/2017    SPINAL CORD STIMULATOR IMPLANT (Cruz Vasquez) performed by Sheridan Vasquez MD at [facility] 57      renal stent placement right    LUMBAR SPINE SURGERY      NOSE SURGERY      DNS repair    PAIN MANAGEMENT PROCEDURE N/A 2021    REMOVAL OF SPINAL CORD STIMULATOR GENERATOR AND LEADS performed by Sheridan Vasquez MD at [facility] 75 AND ADENOIDECTOMY       Social History     Socioeconomic History    Marital status: Single     Spouse name: Not on file    Number of children: Not on file    Years of education: Not on file    Highest education level: Not on file   Occupational History    Not on file   Tobacco Use    Smoking status: Former Smoker     Packs/day: 1.00     Years: 45.00     Pack years: 45.00     Types: Cigarettes     Quit date: 2010     Years since quittin.0    Smokeless tobacco: Never Used   Substance and Sexual Activity    Alcohol use: No    Drug use: No    Sexual activity: Not on file   Other Topics Concern    Not on file   Social History Narrative    Not on file     Social Determinants of Health     Financial Resource Strain: Low Risk     Difficulty of Paying Living Expenses: Not hard at all   Food Insecurity: No Food Insecurity    Worried About [address] in the Last Year: Never true    [address] [facility] [facility] [address] in the Last Year: Never true   Transportation Needs:     Lack of Transportation (Medical): Not on file    Lack of Transportation (Non-Medical):  Not on file   Physical Activity:     Days of Exercise per Week: Not on file    Minutes of Exercise per Session: Not on file   Stress:     Feeling of Stress : Not on file   Social Connections:     Frequency of Communication with Friends and Family: Not on file    Frequency of Social Gatherings with Friends and Family: Not on file    Attends Holiness Services: Not on file    Active Member of Clubs or Organizations: Not on file    Attends Club or Organization Meetings: Not on file    Marital Status: Not on file   Intimate Partner Violence:     Fear of Current or Ex-Partner: Not on file    Emotionally Abused: Not on file    Physically Abused: Not on file    Sexually Abused: Not on file   Housing Stability:     Unable to Pay for Housing in the Last Year: Not on file    Number of Places Lived in the Last Year: Not on file    Unstable Housing in the Last Year: Not on file     Family History   Problem Relation Age of Onset    High Blood Pressure Mother     High Cholesterol Mother     Cancer Mother     Coronary Art Dis Mother         lung    Other Mother         PVD    Heart Disease Father     Cancer Father         bladder    Glaucoma Sister     High Blood Pressure Sister     High Cholesterol Sister     Alcohol Abuse Brother      Allergies   Allergen Reactions    Aspirin Swelling    Sulfa Antibiotics Hives     Bactrim    Ace Inhibitors      vasotec    Aripiprazole     Atenolol     Benzodiazepines      xanax    Calcium Channel Blockers     Cephalosporins Hives     Ceclor  Skin test negative to PCN 3/21/16    Ciprofloxacin Swelling    Citalopram      celexa    Desipramine      Other reaction(s):  Other: See Comments  Headache     Diltiazem     Estrogens      estrace    Flonase [Fluticasone Propionate]     Gabapentin     Hydralazine     Insulin Isophane      bloating    Levofloxacin     Macrolides And Ketolides     Maprotiline      remeron    Niacin And Related      niaspan    Nitroglycerin     Nortriptyline      Other reaction(s): Unknown  Headache     Norvasc [Amlodipine Besylate]     Nsaids     Penicillins     Rosiglitazone     Statins     Sulfonylureas      amaryl, glucotrol    Tetracyclines & Related     Tolterodine      detrol    Tramadol     Tylenol [Acetaminophen]      Headaches sometimes    Valproic Acid And Related     Venlafaxine      effexor    Verapamil     Vit D-Vit E-Safflower Oil     Adhesive Tape Rash    Glyburide Rash    Hydroxychloroquine Hives and Rash    Sulfamethoxazole-Trimethoprim Itching and Rash       Current Outpatient Medications   Medication Sig Dispense Refill    oxyCODONE-acetaminophen (PERCOCET) 5-325 MG per tablet oxycodone-acetaminophen 5 mg-325 mg tablet   TAKE 1 TABLET EVERY 6 TO 8 HOURS AS NEEDED FOR POST OP PAIN      pregabalin (LYRICA) 100 MG capsule TAKE 1 CAPSULE BY MOUTH TWICE DAILY      NALTREXONE HCL PO Take 4.5 mg by mouth daily      vitamin D (ERGOCALCIFEROL) 1.25 MG (48085 UT) CAPS capsule TAKE 1 CAPSULE BY MOUTH 1 TIME A WEEK      TRINTELLIX 10 MG TABS tablet 10 mg daily       lidocaine (LIDODERM) 5 % Place 2 patches onto the skin 2 times daily       cloNIDine (CATAPRES) 0.1 MG tablet Take 0.1 mg by mouth as needed Indications: Has taken twice in the last year If BP >170      clopidogrel (PLAVIX) 75 MG tablet Take by mouth      CONTOUR NEXT TEST strip USE 4 STRIPS DAILY      doxazosin (CARDURA) 2 MG tablet Take 2 mg by mouth 2 times daily       baclofen (LIORESAL) 10 MG tablet Take 10 mg by mouth 3 times daily as needed       betamethasone dipropionate (DIPROLENE) 0.05 % cream Indications: Eczema PRN      carvedilol (COREG) 25 MG tablet TK 1 T PO BID  3    cetirizine (ZYRTEC ALLERGY) 10 MG tablet Take 10 mg by mouth      dicyclomine (BENTYL) 20 MG tablet Take by mouth      naloxone (NARCAN) 4 MG/0.1ML LIQD nasal spray Narcan 4 mg/actuation nasal spray      nitroGLYCERIN (NITROSTAT) 0.3 MG SL tablet DISOLVE 1 TABLET UNDER THE TONGUE AS NEEDED FOR CHEST PAIN.  IF NO REFLIEF CALL 911      montelukast (SINGULAIR) 10 MG tablet TAKE 1 TABLET BY MOUTH DAILY AT BEDTIME      linagliptin (TRADJENTA) 5 MG tablet Take 5 mg by mouth daily       levothyroxine (SYNTHROID) 88 MCG tablet Take 88 mcg by mouth Daily       DULoxetine (CYMBALTA) 60 MG extended release capsule Take 60 mg by mouth daily       insulin regular human (HUMULIN R U-500) 500 UNIT/ML concentrated injection vial 20 Units 2 times daily (with meals)       Evolocumab 140 MG/ML SOAJ Inject 140 mg into the skin every 14 days Indications: High Amount of Fats in the Blood       diclofenac sodium 1 % GEL apply 2gms to areas of joint pain up to four times a day. Avoid contact with eyes. Do not exceed 32gm/day      chlorthalidone (HYGROTON) 25 MG tablet Take 25 mg by mouth      albuterol sulfate  (90 Base) MCG/ACT inhaler Inhale 2 puffs into the lungs every 6 hours as needed for Wheezing      irbesartan (AVAPRO) 300 MG tablet Take 150 mg by mouth 2 times daily  (Patient not taking: Reported on 6/3/2022)       No current facility-administered medications for this visit. Review of Systems   Constitutional: Negative for fever. HENT: Negative for ear pain, tinnitus and trouble swallowing. Eyes: Negative for photophobia and visual disturbance. Respiratory: Negative for choking and shortness of breath. Cardiovascular: Negative for chest pain and palpitations. Gastrointestinal: Negative for nausea and vomiting. Musculoskeletal: Positive for back pain, gait problem, myalgias and neck pain. Negative for joint swelling and neck stiffness. Skin: Negative for color change. Allergic/Immunologic: Negative for food allergies. Neurological: Positive for weakness and numbness. Negative for dizziness, tremors, seizures, syncope, facial asymmetry, speech difficulty, light-headedness and headaches. Psychiatric/Behavioral: Negative for behavioral problems, confusion, hallucinations and sleep disturbance. Objective:   /80 (Site: Left Upper Arm, Position: Sitting, Cuff Size: Medium Adult)   Pulse 72   Ht 5' 6\" (1.676 m)   Wt 263 lb (119.3 kg)   BMI 42.45 kg/m²     Physical Exam  Vitals reviewed.    Eyes:      Pupils: Pupils are equal, round, and reactive to light. Cardiovascular:      Rate and Rhythm: Normal rate and regular rhythm. Heart sounds: No murmur heard. Pulmonary:      Effort: Pulmonary effort is normal.      Breath sounds: Normal breath sounds. Abdominal:      General: Bowel sounds are normal.   Musculoskeletal:         General: Normal range of motion. Cervical back: Normal range of motion. Skin:     General: Skin is warm. Neurological:      Mental Status: She is alert and oriented to person, place, and time. Cranial Nerves: No cranial nerve deficit. Sensory: No sensory deficit. Motor: No abnormal muscle tone. Coordination: Coordination normal.      Deep Tendon Reflexes: Reflexes are normal and symmetric. Babinski sign absent on the right side. Babinski sign absent on the left side. Psychiatric:         Mood and Affect: Mood normal.       Is areflexic in the lower extremities with 1+ reflex in the upper extremities  FLUORO FOR SURGICAL PROCEDURES    Result Date: 11/5/2021  EXAMINATION: FLUORO FOR SURGICAL PROCEDURES CLINICAL HISTORY:  pain COMPARISONS: None available. FINDINGS: Fluoroscopic assistance was provided during neurostimulator lead placement. The total radiation time is 287.1, radiation dose is 326.15mGy. Intraprocedural fluoroscopic support has been provided. Please refer to the procedure report.       Lab Results   Component Value Date    WBC 14.5 11/01/2021    RBC 3.94 11/01/2021    HGB 10.0 11/01/2021    HCT 31.3 11/01/2021    MCV 79.5 11/01/2021    MCH 25.4 11/01/2021    MCHC 31.9 11/01/2021    RDW 17.0 11/01/2021     11/01/2021     Lab Results   Component Value Date     11/01/2021    K 5.5 11/01/2021     11/01/2021    CO2 20 11/01/2021    BUN 44 11/01/2021    CREATININE 1.34 11/01/2021    GFRAA 47.6 11/01/2021    LABGLOM 39.3 11/01/2021    GLUCOSE 82 11/01/2021    GLUCOSE 262 01/08/2020    PROT 7.2 01/08/2020    LABALBU 4.2 01/08/2020    CALCIUM 9.0 11/01/2021 BILITOT 0.5 01/08/2020    ALKPHOS 87 01/08/2020    AST 9 01/08/2020    ALT 10 01/08/2020     No results found for: PROTIME, INR  No results found for: TSH, [id number], FOLATE, FERRITIN, IRON, TIBC, PTRFSAT, RETICCOUNT, TSH, FREET4  Lab Results   Component Value Date    TRIG 155 01/08/2020    HDL 39.0 01/08/2020    LABVLDL 31 01/08/2020     No results found for: Nayan Romulo, LABBENZ, CANNAB, COCAINESCRN, LABMETH, OPIATESCREENURINE, PHENCYCLIDINESCREENURINE, PPXUR, ETOH  No results found for: LITHIUM, DILFRTOT, VALPROATE    Assessment:       Diagnosis Orders   1. Spinal stenosis of lumbar region with neurogenic claudication  MRI LUMBAR SPINE WO CONTRAST    MRA NECK WO CONTRAST    MRI CERVICAL SPINE WO CONTRAST   2. Obesity, Class III, BMI 40-49.9 (morbid obesity) ([address]     3. Neurogenic claudication ([address]  MRI CERVICAL SPINE WO CONTRAST   4. Cervical stenosis of spine  MRI CERVICAL SPINE WO CONTRAST   5. Failed back surgical syndrome     6. Failed neck syndrome     7. Polyneuropathy associated with underlying disease ([address]     Severe lumbar canal stenosis with operative procedures the failed back syndrome with peripheral neuropathy. We treat this patient with Cymbalta which has helped. She has a severe diabetic neuropathy as well. Next patient also has cervical spine stenosis and internal carotid stenosis bilaterally. We were not able to further perform any testing for years given that she had a spinal stimulator which has not been removed. She recently had a coronary artery bypass graft operative procedures as well. I do not see any carotid studies at that time. Therefore recommended a MRI of the lumbar and cervical spine given that she still has considerable issues and has not been evaluated for years. We recommended an MRA of her carotids as well. See her as soon as we have the results. Dameon Falcon MD, Jarett Khan, American Board of Psychiatry & Neurology  Board Certified in Vascular Neurology  Board Certified in Neuromuscular Medicine  Certified in [facility]:      Orders Placed This Encounter   Procedures    MRI [address]     Standing Status:   Future     Standing Expiration Date:   6/3/2023    MRA NECK WO CONTRAST     Standing Status:   Future     Standing Expiration Date:   6/3/2023    MRI CERVICAL SPINE WO CONTRAST     Standing Status:   Future     Standing Expiration Date:   6/3/2023     Order Specific Question:   What is the sedation requirement? Answer:   None     No orders of the defined types were placed in this encounter. Return in about 3 months (around 9/3/2022).       Guadalupe Jones MD

## 2022-06-14 ENCOUNTER — HOSPITAL ENCOUNTER (OUTPATIENT)
Dept: MRI IMAGING | Age: 67
Discharge: HOME | End: 2022-06-16
Payer: MEDICARE

## 2022-06-14 ENCOUNTER — HOSPITAL ENCOUNTER (OUTPATIENT)
Dept: GENERAL RADIOLOGY | Age: 67
Discharge: HOME | End: 2022-06-16
Payer: MEDICARE

## 2022-06-14 DIAGNOSIS — M48.02 CERVICAL STENOSIS OF SPINE: ICD-10-CM

## 2022-06-14 DIAGNOSIS — M25.561 ACUTE PAIN OF BOTH KNEES: ICD-10-CM

## 2022-06-14 DIAGNOSIS — M25.562 ACUTE PAIN OF BOTH KNEES: ICD-10-CM

## 2022-06-14 DIAGNOSIS — M48.062 SPINAL STENOSIS OF LUMBAR REGION WITH NEUROGENIC CLAUDICATION: ICD-10-CM

## 2022-06-14 DIAGNOSIS — G95.19 NEUROGENIC CLAUDICATION (HCC): ICD-10-CM

## 2022-06-14 PROCEDURE — 72148 MRI LUMBAR SPINE W/O DYE: CPT

## 2022-06-14 PROCEDURE — 72141 MRI NECK SPINE W/O DYE: CPT

## 2022-06-14 PROCEDURE — 73565 X-RAY EXAM OF KNEES: CPT

## 2022-06-20 ENCOUNTER — TELEPHONE (OUTPATIENT)
Dept: NEUROLOGY | Age: 67
End: 2022-06-20

## 2022-06-20 NOTE — TELEPHONE ENCOUNTER
Patient would like something sent in for her she has a MRA scheduled next Monday and Pt states shes very claustrophobic, would like this medication sent to [facility] in [place].

## 2022-06-20 NOTE — TELEPHONE ENCOUNTER
Patient states that she is allergic to benzodiazepines please confirm as there is nothing else that can be given for sedation besides benzodiazepines includes Valium and Ativan

## 2022-06-21 NOTE — TELEPHONE ENCOUNTER
Tried to reach patient and had to [facility] for patient to call office back

## 2022-06-27 DIAGNOSIS — M48.062 SPINAL STENOSIS OF LUMBAR REGION WITH NEUROGENIC CLAUDICATION: Primary | ICD-10-CM

## 2022-06-27 RX ORDER — DIAZEPAM 5 MG/1
TABLET ORAL
Qty: 2 TABLET | Refills: 0 | Status: SHIPPED | OUTPATIENT
Start: 2022-06-27 | End: 2022-06-27

## 2022-08-25 ENCOUNTER — HOSPITAL ENCOUNTER (OUTPATIENT)
Dept: MRI IMAGING | Age: 67
Discharge: HOME | End: 2022-08-27
Payer: MEDICARE

## 2022-08-25 DIAGNOSIS — M48.062 SPINAL STENOSIS OF LUMBAR REGION WITH NEUROGENIC CLAUDICATION: ICD-10-CM

## 2022-08-25 PROCEDURE — 70547 MR ANGIOGRAPHY NECK W/O DYE: CPT

## 2022-09-15 PROBLEM — M81.0 POSTMENOPAUSAL OSTEOPOROSIS OF MULTIPLE SITES: Status: ACTIVE | Noted: 2022-07-11

## 2022-09-15 PROBLEM — F32.9 MAJOR DEPRESSIVE DISORDER: Status: ACTIVE | Noted: 2021-11-23

## 2022-09-15 PROBLEM — E53.8 VITAMIN B12 DEFICIENCY: Status: ACTIVE | Noted: 2022-07-11

## 2022-09-15 PROBLEM — S52.509A FRACTURE OF DISTAL END OF RADIUS: Status: ACTIVE | Noted: 2022-09-15

## 2022-09-15 PROBLEM — F41.8 ANXIETY ABOUT HEALTH: Status: ACTIVE | Noted: 2021-11-19

## 2022-09-15 PROBLEM — M17.11 ARTHRITIS OF KNEE, RIGHT: Status: ACTIVE | Noted: 2022-02-01

## 2022-09-15 PROBLEM — N28.9 ACUTE RENAL INSUFFICIENCY: Status: ACTIVE | Noted: 2021-11-11

## 2022-09-15 PROBLEM — N39.0 URINARY TRACT INFECTION: Status: ACTIVE | Noted: 2022-09-15

## 2022-09-15 PROBLEM — Z95.1 S/P CABG (CORONARY ARTERY BYPASS GRAFT): Status: ACTIVE | Noted: 2022-02-21

## 2022-09-15 PROBLEM — R07.9 CHEST PAIN: Status: ACTIVE | Noted: 2022-09-15

## 2022-09-15 PROBLEM — E04.1 NONTOXIC SINGLE THYROID NODULE: Status: ACTIVE | Noted: 2021-10-05

## 2022-09-15 PROBLEM — E11.319 TYPE 2 DIABETES W UNSP DIABETIC RTNOP W/O MACULAR EDEMA (HCC): Status: ACTIVE | Noted: 2022-04-15

## 2022-09-15 PROBLEM — R06.02 SHORTNESS OF BREATH: Status: ACTIVE | Noted: 2022-09-15

## 2022-09-15 PROBLEM — S62.101A CLOSED FRACTURE OF RIGHT WRIST: Status: ACTIVE | Noted: 2022-04-01

## 2022-09-15 PROBLEM — E78.00 PURE HYPERCHOLESTEROLEMIA: Status: ACTIVE | Noted: 2022-02-21

## 2022-09-15 PROBLEM — N39.0 RECURRENT UTI (URINARY TRACT INFECTION): Status: ACTIVE | Noted: 2022-04-01

## 2022-09-15 PROBLEM — F17.200 NICOTINE USE DISORDER: Status: ACTIVE | Noted: 2021-11-10

## 2022-09-15 PROBLEM — J06.9 UPPER RESPIRATORY TRACT INFECTION: Status: ACTIVE | Noted: 2022-09-15

## 2022-09-15 PROBLEM — G62.9 NEUROPATHY: Status: ACTIVE | Noted: 2021-11-23

## 2022-09-15 PROBLEM — M25.531 PAIN IN RIGHT WRIST: Status: ACTIVE | Noted: 2022-07-11

## 2022-10-15 PROBLEM — N39.0 URINARY TRACT INFECTION: Status: RESOLVED | Noted: 2022-09-15 | Resolved: 2022-10-15

## 2022-12-13 PROBLEM — Z87.891 PERSONAL HISTORY OF NICOTINE DEPENDENCE: Status: ACTIVE | Noted: 2022-09-23

## 2022-12-13 PROBLEM — N18.9 ANEMIA IN CHRONIC KIDNEY DISEASE: Status: ACTIVE | Noted: 2017-07-13

## 2022-12-13 PROBLEM — E66.01 MORBID (SEVERE) OBESITY DUE TO EXCESS CALORIES (HCC): Status: ACTIVE | Noted: 2022-09-23
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
## 2022-12-13 PROBLEM — R94.31 ABNORMAL ELECTROCARDIOGRAM (ECG) (EKG): Status: ACTIVE | Noted: 2022-09-19

## 2022-12-13 PROBLEM — Z95.1 PRESENCE OF AORTOCORONARY BYPASS GRAFT: Status: ACTIVE | Noted: 2022-09-23

## 2022-12-13 PROBLEM — S52.571D OTHER INTRAARTICULAR FRACTURE OF LOWER END OF RIGHT RADIUS, SUBSEQUENT ENCOUNTER FOR CLOSED FRACTURE WITH ROUTINE HEALING: Status: ACTIVE | Noted: 2022-08-25

## 2022-12-13 PROBLEM — Z79.02 LONG TERM (CURRENT) USE OF ANTITHROMBOTICS/ANTIPLATELETS: Status: ACTIVE | Noted: 2018-04-11

## 2022-12-13 PROBLEM — Z47.2 ENCOUNTER FOR REMOVAL OF INTERNAL FIXATION DEVICE: Status: ACTIVE | Noted: 2022-09-23

## 2022-12-13 PROBLEM — I12.9 HYPERTENSIVE CHRONIC KIDNEY DISEASE W STG 1-4/UNSP CHR KDNY: Status: ACTIVE | Noted: 2020-07-22

## 2022-12-13 PROBLEM — T84.84XA PAIN DUE TO INTERNAL ORTHOPEDIC PROSTHETIC DEVICES, IMPLANTS AND GRAFTS, INITIAL ENCOUNTER (HCC): Status: ACTIVE | Noted: 2022-09-23

## 2022-12-13 PROBLEM — M65.351 TRIGGER FINGER, RIGHT LITTLE FINGER: Status: ACTIVE | Noted: 2022-09-19

## 2022-12-13 PROBLEM — Z79.4 LONG TERM (CURRENT) USE OF INSULIN (HCC): Status: ACTIVE | Noted: 2022-09-23

## 2022-12-13 PROBLEM — M65.311 TRIGGER THUMB, RIGHT THUMB: Status: ACTIVE | Noted: 2022-09-19

## 2022-12-13 PROBLEM — Z95.5 PRESENCE OF CORONARY ANGIOPLASTY IMPLANT AND GRAFT: Status: ACTIVE | Noted: 2022-09-23

## 2022-12-13 PROBLEM — Z88.0 ALLERGY STATUS TO PENICILLIN: Status: ACTIVE | Noted: 2022-09-23

## 2022-12-13 PROBLEM — D63.1 ANEMIA IN CHRONIC KIDNEY DISEASE: Status: ACTIVE | Noted: 2017-07-13
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
## 2023-01-27 ENCOUNTER — OFFICE VISIT (OUTPATIENT)
Dept: NEUROLOGY | Age: 68
End: 2023-01-27
Payer: MEDICARE

## 2023-01-27 VITALS
BODY MASS INDEX: 43.71 KG/M2 | WEIGHT: 270.8 LBS | SYSTOLIC BLOOD PRESSURE: 125 MMHG | DIASTOLIC BLOOD PRESSURE: 80 MMHG | HEART RATE: 82 BPM

## 2023-01-27 DIAGNOSIS — M96.1 FAILED NECK SYNDROME: ICD-10-CM

## 2023-01-27 DIAGNOSIS — M96.1 FAILED BACK SURGICAL SYNDROME: ICD-10-CM

## 2023-01-27 DIAGNOSIS — I65.23 BILATERAL CAROTID ARTERY STENOSIS: ICD-10-CM

## 2023-01-27 DIAGNOSIS — R27.0 ATAXIA: ICD-10-CM

## 2023-01-27 DIAGNOSIS — G63 POLYNEUROPATHY ASSOCIATED WITH UNDERLYING DISEASE (HCC): Primary | ICD-10-CM

## 2023-01-27 PROCEDURE — 1123F ACP DISCUSS/DSCN MKR DOCD: CPT | Performed by: PSYCHIATRY & NEUROLOGY

## 2023-01-27 PROCEDURE — G8400 PT W/DXA NO RESULTS DOC: HCPCS | Performed by: PSYCHIATRY & NEUROLOGY

## 2023-01-27 PROCEDURE — 3017F COLORECTAL CA SCREEN DOC REV: CPT | Performed by: PSYCHIATRY & NEUROLOGY

## 2023-01-27 PROCEDURE — 1036F TOBACCO NON-USER: CPT | Performed by: PSYCHIATRY & NEUROLOGY

## 2023-01-27 PROCEDURE — G8427 DOCREV CUR MEDS BY ELIG CLIN: HCPCS | Performed by: PSYCHIATRY & NEUROLOGY

## 2023-01-27 PROCEDURE — 99214 OFFICE O/P EST MOD 30 MIN: CPT | Performed by: PSYCHIATRY & NEUROLOGY

## 2023-01-27 PROCEDURE — G8417 CALC BMI ABV UP PARAM F/U: HCPCS | Performed by: PSYCHIATRY & NEUROLOGY

## 2023-01-27 PROCEDURE — 3078F DIAST BP <80 MM HG: CPT | Performed by: PSYCHIATRY & NEUROLOGY

## 2023-01-27 PROCEDURE — 1090F PRES/ABSN URINE INCON ASSESS: CPT | Performed by: PSYCHIATRY & NEUROLOGY

## 2023-01-27 PROCEDURE — 3074F SYST BP LT 130 MM HG: CPT | Performed by: PSYCHIATRY & NEUROLOGY

## 2023-01-27 PROCEDURE — G8484 FLU IMMUNIZE NO ADMIN: HCPCS | Performed by: PSYCHIATRY & NEUROLOGY

## 2023-01-27 RX ORDER — BENZONATATE 100 MG/1
CAPSULE ORAL
COMMUNITY
Start: 2022-12-29

## 2023-01-27 ASSESSMENT — ENCOUNTER SYMPTOMS
COLOR CHANGE: 0
BACK PAIN: 1
VOMITING: 0
PHOTOPHOBIA: 0
TROUBLE SWALLOWING: 0
CHOKING: 0
NAUSEA: 0
SHORTNESS OF BREATH: 0

## 2023-01-27 NOTE — PROGRESS NOTES
Subjective:      Patient ID: Chriss Kayser is a 69 y.o. female who presents today for:  Chief Complaint   Patient presents with    Follow-up     Pt states she had her mri you ordered done and would like to discuss with you. Pt states things are going about the same, pt is still in pain. Pt has noticed the neuropathy is getting worse in her legs. 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 Patient surgical intervention done both with her cervical spine and lumbar spine. Since 2017 we were not able to further obtain studies. Patient is under pain management and continues on Cymbalta. Patient had history of carotid stenosis though he would not was able to obtain studies as she has only 1 kidney and we are trying to avoid the dye. Patient had a spinal stimulator and we were not able to get through any of her studies but finally the spinal stimulator was removed and now we were able to get all studies which I reviewed. She had a cervical spine MRI which shows old fusion without canal stenosis she had a lumbar spine MRI which I reviewed and shows old spinal fusion without any canal stenosis. Patient also had an MRA the neck and is unremarkable. And is also on Lyrica which is helping her legs.   Patient is not ready for the fall she had 1 fall last year with wrist injuries    Past Medical History:   Diagnosis Date    Allergic rhinitis     Carotid artery stenosis     Cystocele with prolapse     Diabetes mellitus ([address]     hx since 10/1992    Hiatal hernia     Hyperlipidemia     past meds / now on Repatha    Hypertension     meds > 76 yrs / denies TIA or stroke    Inflammatory arthritis 11/20/2012    Macular edema     Neurogenic claudication (HCC)     Neuropathy     AMANDEEP (obstructive sleep apnea)     DOES NOT USE CPAP    Osteoarthritis     Peripheral vascular disease ([address]     PVD (peripheral vascular disease) ([address]     2 stents left thigh Renal artery occlusion (HCC)     stents of right    Renal insufficiency     Thyroid disease     meds > 4 yrs     Past Surgical History:   Procedure Laterality Date    BACK SURGERY      C 3-4 fusion    BACK SURGERY      L 3-4-5 laminectomy    CARPAL TUNNEL RELEASE Bilateral     CERVICAL SPINE SURGERY      CHOLECYSTECTOMY      COLONOSCOPY      CYSTOCELE REPAIR      ENDOSCOPY, COLON, DIAGNOSTIC      FEMORAL BYPASS       left thigh stent     HYSTERECTOMY (CERVIX STATUS UNKNOWN)      IMPLANTATION VAGAL NERVE STIMULATOR N/A 10/30/2017    SPINAL CORD STIMULATOR IMPLANT (Jamas Smith) performed by Giselle Hernandez MD at . [facility]      renal stent placement right    LUMBAR SPINE SURGERY      NOSE SURGERY      DNS repair    PAIN MANAGEMENT PROCEDURE N/A 2021    REMOVAL OF SPINAL CORD STIMULATOR GENERATOR AND LEADS performed by Giselle Hernandez MD at [address]       Social History     Socioeconomic History    Marital status: Single     Spouse name: Not on file    Number of children: Not on file    Years of education: Not on file    Highest education level: Not on file   Occupational History    Not on file   Tobacco Use    Smoking status: Former     Packs/day: 1.00     Years: 45.00     Pack years: 45.00     Types: Cigarettes     Quit date: 2010     Years since quittin.6    Smokeless tobacco: Never   Substance and Sexual Activity    Alcohol use: No    Drug use: No    Sexual activity: Not on file   Other Topics Concern    Not on file   Social History Narrative    Not on file     Social Determinants of Health     Financial Resource Strain: Not on file   Food Insecurity: Not on file   Transportation Needs: Not on file   Physical Activity: Not on file   Stress: Not on file   Social Connections: Not on file   Intimate Partner Violence: Not on file   Housing Stability: Not on file     Family History   Problem Relation Age of Onset    High Blood Pressure Mother     High Cholesterol Mother     Cancer Mother     Coronary Art Dis Mother         lung    Other Mother         PVD    Heart Disease Father     Cancer Father         bladder    Glaucoma Sister     High Blood Pressure Sister     High Cholesterol Sister     Alcohol Abuse Brother      Allergies   Allergen Reactions    Aspirin Swelling    Sulfa Antibiotics Hives     Bactrim    Ace Inhibitors      vasotec    Aripiprazole     Atenolol     Benzodiazepines      xanax    Calcium Channel Blockers     Cephalosporins Hives     Ceclor  Skin test negative to PCN 3/21/16    Ciprofloxacin Swelling    Citalopram      celexa    Desipramine      Other reaction(s):  Other: See Comments  Headache     Diltiazem     Estrogens      estrace    Flonase [Fluticasone Propionate]     Gabapentin     Hydralazine     Insulin Isophane      bloating    Levofloxacin     Macrolides And Ketolides     Maprotiline      remeron    Niacin And Related      niaspan    Nitroglycerin     Nortriptyline      Other reaction(s): Unknown  Headache     Norvasc [Amlodipine Besylate]     Nsaids     Penicillins     Rosiglitazone     Statins     Sulfonylureas      amaryl, glucotrol    Tetracyclines & Related     Tolterodine      detrol    Tramadol     Tylenol [Acetaminophen]      Headaches sometimes    Valproic Acid And Related     Venlafaxine      effexor    Verapamil     Vit D-Vit E-Safflower Oil     Adhesive Tape Rash    Glyburide Rash    Hydroxychloroquine Hives and Rash    Sulfamethoxazole-Trimethoprim Itching and Rash       Current Outpatient Medications   Medication Sig Dispense Refill    benzonatate (TESSALON) 100 MG capsule benzonatate 100 mg capsule   TAKE 1 CAPSULE BY MOUTH THREE TIMES DAILY AS NEEDED FOR COUGH      oxyCODONE-acetaminophen (PERCOCET) 5-325 MG per tablet oxycodone-acetaminophen 5 mg-325 mg tablet   TAKE 1 TABLET EVERY 6 TO 8 HOURS AS NEEDED FOR POST OP PAIN      pregabalin (LYRICA) 100 MG capsule TAKE 1 CAPSULE BY MOUTH TWICE DAILY vitamin D (ERGOCALCIFEROL) 1.25 MG (93120 UT) CAPS capsule TAKE 1 CAPSULE BY MOUTH 1 TIME A WEEK      TRINTELLIX 10 MG TABS tablet 10 mg daily       lidocaine (LIDODERM) 5 % Place 2 patches onto the skin 2 times daily       cloNIDine (CATAPRES) 0.1 MG tablet Take 0.1 mg by mouth as needed Indications: Has taken twice in the last year If BP >170      clopidogrel (PLAVIX) 75 MG tablet Take by mouth      CONTOUR NEXT TEST strip USE 4 STRIPS DAILY      doxazosin (CARDURA) 2 MG tablet Take 2 mg by mouth 2 times daily       baclofen (LIORESAL) 10 MG tablet Take 10 mg by mouth 3 times daily as needed       betamethasone dipropionate (DIPROLENE) 0.05 % cream Indications: Eczema PRN      carvedilol (COREG) 25 MG tablet TK 1 T PO BID  3    cetirizine (ZYRTEC) 10 MG tablet Take 10 mg by mouth      dicyclomine (BENTYL) 20 MG tablet Take by mouth      naloxone 4 MG/0.1ML LIQD nasal spray Narcan 4 mg/actuation nasal spray      nitroGLYCERIN (NITROSTAT) 0.3 MG SL tablet DISOLVE 1 TABLET UNDER THE TONGUE AS NEEDED FOR CHEST PAIN. IF NO REFLIEF CALL 911      montelukast (SINGULAIR) 10 MG tablet TAKE 1 TABLET BY MOUTH DAILY AT BEDTIME      linagliptin (TRADJENTA) 5 MG tablet Take 5 mg by mouth daily       levothyroxine (SYNTHROID) 88 MCG tablet Take 88 mcg by mouth Daily       DULoxetine (CYMBALTA) 60 MG extended release capsule Take 60 mg by mouth daily       insulin regular human (HUMULIN R U-500) 500 UNIT/ML concentrated injection vial 20 Units 2 times daily (with meals)       Evolocumab 140 MG/ML SOAJ Inject 140 mg into the skin every 14 days Indications: High Amount of Fats in the Blood       diclofenac sodium 1 % GEL apply 2gms to areas of joint pain up to four times a day. Avoid contact with eyes.  Do not exceed 32gm/day      chlorthalidone (HYGROTON) 25 MG tablet Take 25 mg by mouth      albuterol sulfate  (90 Base) MCG/ACT inhaler Inhale 2 puffs into the lungs every 6 hours as needed for Wheezing      NALTREXONE HCL PO Take 4.5 mg by mouth daily (Patient not taking: Reported on 1/27/2023)      irbesartan (AVAPRO) 300 MG tablet Take 150 mg by mouth 2 times daily  (Patient not taking: No sig reported)       No current facility-administered medications for this visit. Review of Systems   Constitutional:  Negative for fever. HENT:  Negative for ear pain, tinnitus and trouble swallowing. Eyes:  Negative for photophobia and visual disturbance. Respiratory:  Negative for choking and shortness of breath. Cardiovascular:  Negative for chest pain and palpitations. Gastrointestinal:  Negative for nausea and vomiting. Musculoskeletal:  Positive for back pain, gait problem, neck pain and neck stiffness. Negative for joint swelling and myalgias. Skin:  Negative for color change. Allergic/Immunologic: Negative for food allergies. Neurological:  Positive for weakness. Negative for dizziness, tremors, seizures, syncope, facial asymmetry, speech difficulty, light-headedness, numbness and headaches. Psychiatric/Behavioral:  Negative for behavioral problems, confusion, hallucinations and sleep disturbance. Objective:   /80 (Site: Right Upper Arm, Position: Sitting, Cuff Size: Medium Adult)   Pulse 82   Wt 270 lb 12.8 oz (122.8 kg)   BMI 43.71 kg/m²     Physical Exam  Vitals reviewed. Eyes:      Pupils: Pupils are equal, round, and reactive to light. Cardiovascular:      Rate and Rhythm: Normal rate and regular rhythm. Heart sounds: No murmur heard. Pulmonary:      Effort: Pulmonary effort is normal.      Breath sounds: Normal breath sounds. Abdominal:      General: Bowel sounds are normal.   Musculoskeletal:         General: Normal range of motion. Cervical back: Normal range of motion. Skin:     General: Skin is warm. Neurological:      Mental Status: She is alert and oriented to person, place, and time. Cranial Nerves: No cranial nerve deficit. Sensory: No sensory deficit. Motor: No abnormal muscle tone. Coordination: Coordination normal.      Deep Tendon Reflexes: Reflexes are normal and symmetric. Babinski sign absent on the right side. Babinski sign absent on the left side. Comments: Restriction of neck movement in extension flexion rotation without hyperreflexia. She is areflexic in the lower extremities. Psychiatric:         Mood and Affect: Mood normal.       MRA NECK WO CONTRAST    Result Date: 8/25/2022  EXAMINATION: MRA NECK WO CONTRAST DATE AND TIME:8/25/2022 8:44 AM CLINICAL HISTORY: Stroke symptoms  M48.062 Spinal stenosis of lumbar region with neurogenic claudication ICD10 COMPARISON: None TECHNIQUE: Time-of-flight images of the common carotid arteries, carotid bifurcations and internal and external carotid arteries are performed including 3-dimensional MIP reconstructions. MRA NECK FINDINGS Carotid:  There is no hemodynamically significant stenosis, vascular dissection or aneurysm of the right common or internal carotid arteries. There is no hemodynamically significant stenosis, vascular dissection or aneurysm in the left common or internal carotid arteries. Cervical Vertebral Arteries: Patency: The vertebral arteries are well visualized to the level of the basilar artery. There is no focal stenosis aneurysm or dissection. There is a right common carotid system. Negative MRA of the neck.        Lab Results   Component Value Date/Time    WBC 14.5 11/01/2021 01:34 PM    RBC 3.94 11/01/2021 01:34 PM    HGB 10.0 11/01/2021 01:34 PM    HCT 31.3 11/01/2021 01:34 PM    MCV 79.5 11/01/2021 01:34 PM    MCH 25.4 11/01/2021 01:34 PM    MCHC 31.9 11/01/2021 01:34 PM    RDW 17.0 11/01/2021 01:34 PM     11/01/2021 01:34 PM     Lab Results   Component Value Date/Time     11/01/2021 01:34 PM    K 5.5 11/01/2021 01:34 PM     11/01/2021 01:34 PM    CO2 20 11/01/2021 01:34 PM    BUN 44 11/01/2021 01:34 PM    CREATININE 1.34 11/01/2021 01:34 PM    GFRAA 47.6 11/01/2021 01:34 PM    LABGLOM 39.3 11/01/2021 01:34 PM    GLUCOSE 82 11/01/2021 01:34 PM    GLUCOSE 262 01/08/2020 11:46 AM    PROT 7.2 01/08/2020 11:46 AM    LABALBU 4.2 01/08/2020 11:46 AM    CALCIUM 9.0 11/01/2021 01:34 PM    BILITOT 0.5 01/08/2020 11:46 AM    ALKPHOS 87 01/08/2020 11:46 AM    AST 9 01/08/2020 11:46 AM    ALT 10 01/08/2020 11:46 AM     No results found for: PROTIME, INR  No results found for: TSH, EPUPJOJS21, FOLATE, FERRITIN, IRON, TIBC, PTRFSAT, RETICCOUNT, TSH, FREET4  Lab Results   Component Value Date/Time    TRIG 155 01/08/2020 11:46 AM    HDL 39.0 01/08/2020 11:46 AM    LABVLDL 31 01/08/2020 11:46 AM     No results found for: Marilyne Jane, LABBENZ, CANNAB, COCAINESCRN, LABMETH, OPIATESCREENURINE, PHENCYCLIDINESCREENURINE, PPXUR, ETOH  No results found for: LITHIUM, DILFRTOT, VALPROATE    Assessment:       Diagnosis Orders   1. Polyneuropathy associated with underlying disease ([address]        2. Failed back surgical syndrome        3. Failed neck syndrome        4. Bilateral carotid artery stenosis        5. Ataxia        Peripheral neuropathy with neuropathic pain responsive to Cymbalta and Lyrica. This is optimally treated. She has not any further worsening. Since last seen we had further recommended obtaining all her image studies which we have not been able to do since 2017 due to a spinal stimulator. The spinal stimulator was discontinued. She has any had 1 kidney and therefore she was not a candidate for CT angiograms. She had a previous history of carotid stenosis therefore we obtained an MRI a of the intracranial circulation. She continues on Plavix and her MRA is entirely normal.    We further obtain a cervical spine MRI which shows old fusion which is very stable without cord compression. We further obtain an MRI of the lumbar spine which are reviewed and shows a good fusion without any requirement of surgical intervention.   At this time it is likely that what ever she is doing she is doing well on her vascular status and does not require any other intervention from my end except for a follow-up in 1 year      Dameon Jacobo MD, [id number], American Board of Psychiatry & Neurology  Board Certified in Vascular Neurology  Board Certified in Neuromuscular Medicine  Certified in [facility]:      No orders of the defined types were placed in this encounter. No orders of the defined types were placed in this encounter. No follow-ups on file.       Manjula Mena MD

## 2024-01-11 PROBLEM — R19.7 DIARRHEA: Status: ACTIVE | Noted: 2023-09-23

## 2024-01-11 PROBLEM — H25.13 AGE-RELATED NUCLEAR CATARACT OF BOTH EYES: Status: ACTIVE | Noted: 2023-01-16

## 2024-01-11 PROBLEM — H25.013 CORTICAL AGE-RELATED CATARACT OF BOTH EYES: Status: ACTIVE | Noted: 2023-01-16

## 2024-01-11 PROBLEM — H43.813 VITREOUS DEGENERATION OF BOTH EYES: Status: ACTIVE | Noted: 2023-05-18

## 2024-01-11 PROBLEM — M17.0 OSTEOARTHRITIS OF BOTH KNEES: Status: ACTIVE | Noted: 2023-03-28

## 2024-01-11 PROBLEM — N17.9 AKI (ACUTE KIDNEY INJURY) (HCC): Status: ACTIVE | Noted: 2018-08-28

## 2024-01-11 PROBLEM — H35.373 EPIRETINAL MEMBRANE (ERM) OF BOTH EYES: Status: ACTIVE | Noted: 2023-01-16

## 2024-01-11 PROBLEM — G56.20 ULNAR NERVE ENTRAPMENT AT ELBOW: Status: ACTIVE | Noted: 2024-01-11

## 2024-01-11 PROBLEM — H25.049 POSTERIOR SUBCAPSULAR AGE-RELATED CATARACT: Status: ACTIVE | Noted: 2023-01-16

## 2024-01-11 PROBLEM — R30.0 DYSURIA: Status: ACTIVE | Noted: 2023-09-22

## 2024-01-11 PROBLEM — K52.9 GASTROENTERITIS: Status: ACTIVE | Noted: 2023-09-22

## 2024-01-26 ENCOUNTER — OFFICE VISIT (OUTPATIENT)
Dept: NEUROLOGY | Age: 69
End: 2024-01-26

## 2024-01-26 VITALS
WEIGHT: 276 LBS | SYSTOLIC BLOOD PRESSURE: 122 MMHG | BODY MASS INDEX: 44.55 KG/M2 | HEART RATE: 63 BPM | DIASTOLIC BLOOD PRESSURE: 60 MMHG

## 2024-01-26 DIAGNOSIS — I65.23 BILATERAL CAROTID ARTERY STENOSIS: ICD-10-CM

## 2024-01-26 DIAGNOSIS — G43.709 CHRONIC MIGRAINE WITHOUT AURA WITHOUT STATUS MIGRAINOSUS, NOT INTRACTABLE: ICD-10-CM

## 2024-01-26 DIAGNOSIS — M96.1 FAILED BACK SURGICAL SYNDROME: Primary | ICD-10-CM

## 2024-01-26 DIAGNOSIS — E11.42 DIABETIC POLYNEUROPATHY ASSOCIATED WITH TYPE 2 DIABETES MELLITUS (HCC): ICD-10-CM

## 2024-01-26 DIAGNOSIS — M48.062 SPINAL STENOSIS OF LUMBAR REGION WITH NEUROGENIC CLAUDICATION: ICD-10-CM

## 2024-01-26 DIAGNOSIS — M54.2 CERVICALGIA: ICD-10-CM

## 2024-01-26 ASSESSMENT — ENCOUNTER SYMPTOMS
PHOTOPHOBIA: 0
TROUBLE SWALLOWING: 0
BACK PAIN: 1
SHORTNESS OF BREATH: 0
VOMITING: 0
NAUSEA: 0
COLOR CHANGE: 0
CHOKING: 0

## 2024-01-26 NOTE — PROGRESS NOTES
Subjective:      Patient ID: Paula Moore is a 68 y.o. female who presents today for:  Chief Complaint   Patient presents with    Follow-up     Pt states she needs back surgery and a knee replacement and they are looking at carpal tunnel in her right hand. Pt has concern about her neuropathy in her legs and feet she says its getting worst.        HPI 68-year-old right-handed female with a known history of failed back and neck syndrome.  Patient is on Cymbalta..  Patient also has history of carotid stenosis and we are not able to obtain further evaluation on the same given that she has 1 kidney and diabetes be avoided.  Patient has a spinal stimulator.  Patient had an MRI which shows all old spinal fusions.  Further obtain an MRA which was unremarkable and therefore the carotid stenosis on the ultrasound may not be reliable.  Patient has not any falls.  She still has some sacral pain and continues on Lyrica and she is now on Belbuca for pain as well.  On and off she does get oxycodone.  She is followed by pain management as well.  Past Medical History:   Diagnosis Date    Allergic rhinitis     Carotid artery stenosis     Cystocele with prolapse     Diabetes mellitus (HCC)     hx since 10/1992    Hiatal hernia     Hyperlipidemia     past meds / now on Repatha    Hypertension     meds > 74 yrs / denies TIA or stroke    Inflammatory arthritis 11/20/2012    Macular edema     Neurogenic claudication     Neuropathy     AMANDEEP (obstructive sleep apnea)     DOES NOT USE CPAP    Osteoarthritis     Peripheral vascular disease (HCC)     PVD (peripheral vascular disease) ([facility])     2 stents left thigh    Renal artery occlusion (HCC)     stents of right    Renal insufficiency     Thyroid disease     meds > 4 yrs     Past Surgical History:   Procedure Laterality Date    BACK SURGERY      C 3-4 fusion    BACK SURGERY      L 3-4-5 laminectomy    CARPAL TUNNEL RELEASE Bilateral     CERVICAL SPINE SURGERY      CHOLECYSTECTOMY

## 2024-09-16 ENCOUNTER — HOSPITAL ENCOUNTER (EMERGENCY)
Facility: HOSPITAL | Age: 69
Discharge: HOME | End: 2024-09-16
Attending: STUDENT IN AN ORGANIZED HEALTH CARE EDUCATION/TRAINING PROGRAM
Payer: MEDICARE

## 2024-09-16 ENCOUNTER — APPOINTMENT (OUTPATIENT)
Dept: RADIOLOGY | Facility: HOSPITAL | Age: 69
End: 2024-09-16
Payer: MEDICARE

## 2024-09-16 VITALS
HEIGHT: 68 IN | SYSTOLIC BLOOD PRESSURE: 196 MMHG | DIASTOLIC BLOOD PRESSURE: 85 MMHG | TEMPERATURE: 98.2 F | WEIGHT: 278 LBS | BODY MASS INDEX: 42.13 KG/M2 | HEART RATE: 68 BPM | RESPIRATION RATE: 18 BRPM | OXYGEN SATURATION: 93 %

## 2024-09-16 DIAGNOSIS — S93.402A SPRAIN OF LEFT ANKLE, UNSPECIFIED LIGAMENT, INITIAL ENCOUNTER: ICD-10-CM
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
## 2024-09-16 LAB
GLUCOSE BLD MANUAL STRIP-MCNC: 122 MG/DL (ref 74–99)
GLUCOSE BLD MANUAL STRIP-MCNC: 70 MG/DL (ref 74–99)

## 2024-09-16 PROCEDURE — 72220 X-RAY EXAM SACRUM TAILBONE: CPT

## 2024-09-16 PROCEDURE — 73630 X-RAY EXAM OF FOOT: CPT | Mod: LT

## 2024-09-16 PROCEDURE — 2500000001 HC RX 250 WO HCPCS SELF ADMINISTERED DRUGS (ALT 637 FOR MEDICARE OP): Performed by: STUDENT IN AN ORGANIZED HEALTH CARE EDUCATION/TRAINING PROGRAM

## 2024-09-16 PROCEDURE — 72220 X-RAY EXAM SACRUM TAILBONE: CPT | Performed by: RADIOLOGY

## 2024-09-16 PROCEDURE — 73630 X-RAY EXAM OF FOOT: CPT | Mod: LEFT SIDE | Performed by: RADIOLOGY

## 2024-09-16 PROCEDURE — 72131 CT LUMBAR SPINE W/O DYE: CPT | Performed by: RADIOLOGY

## 2024-09-16 PROCEDURE — 99284 EMERGENCY DEPT VISIT MOD MDM: CPT

## 2024-09-16 PROCEDURE — 73590 X-RAY EXAM OF LOWER LEG: CPT | Mod: LT

## 2024-09-16 PROCEDURE — 73590 X-RAY EXAM OF LOWER LEG: CPT | Mod: LEFT SIDE | Performed by: RADIOLOGY

## 2024-09-16 PROCEDURE — 72131 CT LUMBAR SPINE W/O DYE: CPT

## 2024-09-16 PROCEDURE — 73610 X-RAY EXAM OF ANKLE: CPT | Mod: LT

## 2024-09-16 PROCEDURE — 73610 X-RAY EXAM OF ANKLE: CPT | Mod: LEFT SIDE | Performed by: RADIOLOGY

## 2024-09-16 PROCEDURE — 82947 ASSAY GLUCOSE BLOOD QUANT: CPT

## 2024-09-16 RX ORDER — OXYCODONE HYDROCHLORIDE 5 MG/1
5 TABLET ORAL ONCE
Status: COMPLETED | OUTPATIENT
Start: 2024-09-16 | End: 2024-09-16

## 2024-09-16 RX ORDER — ACETAMINOPHEN 325 MG/1
975 TABLET ORAL ONCE
Status: COMPLETED | OUTPATIENT
Start: 2024-09-16 | End: 2024-09-16

## 2024-09-16 RX ORDER — LIDOCAINE 560 MG/1
1 PATCH PERCUTANEOUS; TOPICAL; TRANSDERMAL ONCE
Status: DISCONTINUED | OUTPATIENT
Start: 2024-09-16 | End: 2024-09-16 | Stop reason: HOSPADM

## 2024-09-16 ASSESSMENT — COLUMBIA-SUICIDE SEVERITY RATING SCALE - C-SSRS
6. HAVE YOU EVER DONE ANYTHING, STARTED TO DO ANYTHING, OR PREPARED TO DO ANYTHING TO END YOUR LIFE?: NO
2. HAVE YOU ACTUALLY HAD ANY THOUGHTS OF KILLING YOURSELF?: NO
1. IN THE PAST MONTH, HAVE YOU WISHED YOU WERE DEAD OR WISHED YOU COULD GO TO SLEEP AND NOT WAKE UP?: NO

## 2024-09-16 ASSESSMENT — LIFESTYLE VARIABLES
TOTAL SCORE: 0
EVER HAD A DRINK FIRST THING IN THE MORNING TO STEADY YOUR NERVES TO GET RID OF A HANGOVER: NO
HAVE PEOPLE ANNOYED YOU BY CRITICIZING YOUR DRINKING: NO
EVER FELT BAD OR GUILTY ABOUT YOUR DRINKING: NO
HAVE YOU EVER FELT YOU SHOULD CUT DOWN ON YOUR DRINKING: NO

## 2024-09-16 ASSESSMENT — PAIN DESCRIPTION - LOCATION
LOCATION: BACK
LOCATION: ANKLE

## 2024-09-16 ASSESSMENT — PAIN SCALES - GENERAL
PAINLEVEL_OUTOF10: 10 - WORST POSSIBLE PAIN
PAINLEVEL_OUTOF10: 10 - WORST POSSIBLE PAIN
PAINLEVEL_OUTOF10: 4

## 2024-09-16 ASSESSMENT — PAIN - FUNCTIONAL ASSESSMENT: PAIN_FUNCTIONAL_ASSESSMENT: 0-10

## 2024-09-16 ASSESSMENT — PAIN DESCRIPTION - PAIN TYPE: TYPE: ACUTE PAIN

## 2024-09-16 ASSESSMENT — PAIN DESCRIPTION - ORIENTATION: ORIENTATION: LEFT

## 2024-09-16 NOTE — ED PROVIDER NOTES
EMERGENCY DEPARTMENT ENCOUNTER      Pt Name: Hayley Quezada  MRN: [id number]  Birthdate 1955  Date of evaluation: 9/16/2024  Provider: José Manuel Freitas MD    CHIEF COMPLAINT       Chief Complaint   Patient presents with    Ankle Injury     Mechanical fall. Left ankle           HISTORY OF PRESENT ILLNESS    HPI  Patient is a 69-year-old female with history of CAD with stents on Plavix, chronic back pain on Suboxone patch, DM2, HTN, SAHRA, CKD, gout presenting after a fall.  This occurred at approximately 2 PM today.  She got her left ankle stuck in a chipmunk hole, twisting as she fell around onto her backside and lower back.  She did not strike her head or lose consciousness.  She is primarily complaining of left ankle, sacral, and lower back pain.  Pain is 10/10, sharp, nonradiating, exacerbated with any movement or pressure, without consistent alleviating factors.  Patient was able to get herself off the ground and ambulate at her baseline but with pain.  She denies loss of consciousness, headache, visual changes, nausea, vomiting.  She did fall last week to when she tripped, with a small laceration to her right pinky finger.  She did not hit her head or lose consciousness at that time either.    Nursing Notes were reviewed.    PAST MEDICAL HISTORY     Past Medical History:   Diagnosis Date    Other vascular myelopathies (Multi)     Neurogenic claudication    Personal history of other diseases of the circulatory system     Personal history of renal artery stenosis    Personal history of other diseases of the musculoskeletal system and connective tissue     Personal history of arthritis    Personal history of other diseases of the nervous system and sense organs     History of sleep apnea    Personal history of other diseases of the respiratory system     History of bronchitis    Personal history of pneumonia (recurrent)     History of pneumonia         SURGICAL HISTORY       Past Surgical History:   Procedure  Laterality Date    CARPAL TUNNEL RELEASE  01/21/2014    Neuroplasty Median Nerve At Carpal Tunnel    CERVICAL FUSION  01/21/2014    Cervical Vertebral Fusion    CHOLECYSTECTOMY  01/21/2014    Cholecystectomy    HYSTERECTOMY  01/21/2014    Hysterectomy    OTHER SURGICAL HISTORY  01/21/2014    Rectal Surgery Repair Of Perirectal Fistula    OTHER SURGICAL HISTORY  01/21/2014    Previous Stent Placement    OTHER SURGICAL HISTORY  01/28/2022    Colonoscopy    OTHER SURGICAL HISTORY  01/28/2022    Renal angioplasty and stenting    OTHER SURGICAL HISTORY  01/28/2022    Back surgery    OTHER SURGICAL HISTORY  01/28/2022    Hernia repair    OTHER SURGICAL HISTORY  01/28/2022    Lumbar laminectomy    TONSILLECTOMY  01/21/2014    Tonsillectomy With Adenoidectomy         CURRENT MEDICATIONS       Previous Medications    No medications on file       ALLERGIES     Aspirin, Cholecalciferol (vitamin d3), Amlodipine, Benzodiazepines, Bupropion, Cephalosporins, Citalopram, Diltiazem, Estrogens, Fluticasone, Gabapentin, Glyburide, Hydralazine, Hydroxychloroquine, Levothyroxine, Macrolide antibiotics, Niacin, Nitrofurantoin, Nortriptyline, Nsaids (non-steroidal anti-inflammatory drug), Phenylephrine, Quinolones, Rosiglitazone, Rosuvastatin, Sulfa (sulfonamide antibiotics), Sulfonylureas, Tetracyclic antidepressants, Tolterodine, Tramadol, Venlafaxine, Verapamil, Aripiprazole, Atenolol, Celecoxib, Clarithromycin, Glimepiride, Insulin isophane (nph), Insulin nph isoph u-100 human, Loratadine, Metformin, Nabumetone, Nitrofuran analogues, Nitroglycerin, Pantoprazole, Paroxetine, Penicillins, Phenylpiperazine antidepressant, Rofecoxib, Sertraline, Tetracyclines, Tizanidine, Valproic acid analogues, Adhesive, Iodine, and Sulfamethoxazole-trimethoprim    FAMILY HISTORY     No family history on file.       SOCIAL HISTORY       Social History     Socioeconomic History    Marital status:    Tobacco Use    Smoking status: Never     Smokeless tobacco: Never   Substance and Sexual Activity    Alcohol use: Never    Drug use: Never     Social Determinants of Health     Financial Resource Strain: Medium Risk (1/20/2024)    Received from [facility]    Overall Financial Resource Strain (CARDIA)     Difficulty of Paying Living Expenses: Somewhat hard   Food Insecurity: No Food Insecurity (1/20/2024)    Received from [facility]    Hunger Vital Sign     Worried About Running Out of Food in the Last Year: Never true     Ran Out of Food in the Last Year: Never true   Transportation Needs: No Transportation Needs (1/20/2024)    Received from [facility]    PRAPARE - Transportation     Lack of Transportation (Medical): No     Lack of Transportation (Non-Medical): No   Physical Activity: Inactive (1/20/2024)    Received from [facility]    Exercise Vital Sign     Days of Exercise per Week: 0 days     Minutes of Exercise per Session: 0 min   Stress: No Stress Concern Present (1/20/2024)    Received from [facility]    [employer] [place] [place] - Occupational Stress Questionnaire     Feeling of Stress : Only a little   Social Connections: Moderately Isolated (1/20/2024)    Received from [facility]    Social Connection and Isolation Panel [NHANES]     Frequency of Communication with Friends and Family: More than three times a week     Frequency of Social Gatherings with Friends and Family: Once a week     Attends Christianity Services: 1 to 4 times per year     Attends Club or Organization Meetings: Never     Marital Status:        SCREENINGS                        PHYSICAL EXAM    (up to 7 for level 4, 8 or more for level 5)     ED Triage Vitals [09/16/24 1601]   Temperature Heart Rate Respirations BP   37 °C (98.6 °F) 79 20 (!) 197/98      Pulse Ox Temp Source Heart Rate Source Patient Position   98 % Temporal Monitor Sitting      BP Location FiO2 (%)     Right arm --       Physical Exam  Constitutional:        General: She is not in acute distress.     Appearance: She is not toxic-appearing.   HENT:      Head: Normocephalic and atraumatic.      Nose: Nose normal.      Mouth/Throat:      Mouth: Mucous membranes are moist.      Pharynx: Oropharynx is clear.   Eyes:      Extraocular Movements: Extraocular movements intact.      Conjunctiva/sclera: Conjunctivae normal.   Cardiovascular:      Rate and Rhythm: Normal rate and regular rhythm.      Pulses: Normal pulses.      Heart sounds: Normal heart sounds.   Pulmonary:      Effort: Pulmonary effort is normal. No respiratory distress.      Breath sounds: Normal breath sounds.   Abdominal:      General: There is no distension.      Palpations: Abdomen is soft.      Tenderness: There is no abdominal tenderness.   Musculoskeletal:      Cervical back: Normal range of motion and neck supple.      Comments: Swelling and pain to palpation of the lateral left ankle and the bottom of the left midfoot.  Pain to palpation of the lumbar spine with tight left-sided paraspinal muscles.  No palpable step-offs or deformities of the entire spine.   Skin:     General: Skin is warm and dry.   Neurological:      General: No focal deficit present.          DIAGNOSTIC RESULTS     LABS:  Labs Reviewed   POCT GLUCOSE - Abnormal       Result Value    POCT Glucose 70 (*)        All other labs were within normal range or not returned as of this dictation.    Imaging  CT lumbar spine wo IV contrast   Final Result   No acute fracture or traumatic subluxation of the lumbar spine.        Postsurgical and degenerative changes as noted above.        MACRO:   None        Signed by: Azalia Thomas 9/16/2024 7:38 PM   Dictation workstation:   [id number]      XR ankle left 3+ views   Final Result   Lateral malleolar soft tissue swelling without evidence of left ankle   or lower leg fracture.        Signed by: Matt Domingo 9/16/2024 5:55 PM   Dictation workstation:   [id number]      XR tibia fibula left 2  views   Final Result   Lateral malleolar soft tissue swelling without evidence of left ankle   or lower leg fracture.        Signed by: Matt Domingo 9/16/2024 5:55 PM   Dictation workstation:   [id number]      XR foot left 3+ views   Final Result   No acute findings left foot.        Signed by: Matt Domingo 9/16/2024 6:05 PM   Dictation workstation:   [id number]      XR sacrum coccyx 2+ views   Final Result   Unremarkable sacral radiographs.        Signed by: Matt Domingo 9/16/2024 6:04 PM   Dictation workstation:   [id number]           Procedures  Procedures     EMERGENCY DEPARTMENT COURSE/MDM:     Diagnoses as of 09/16/24 2108   Fall, initial encounter   Sprain of left ankle, unspecified ligament, initial encounter        Medical Decision Making  History obtained from the patient.  Records including labs, imaging, notes reviewed.  Patient given Tylenol, oxycodone with improvement in her pain.  To rule out fracture or disruption of the hardware, CT lumbar spine was obtained which did not demonstrate any acute findings.  X-ray of the left foot, ankle, tib-fib, sacrum and coccyx were negative for acute findings.  Patient was able to ambulate at her baseline with a walker.  Notably, patient's blood sugar was low at 74 which she was given juice with improvement.  She was subsequently discharged home in satisfactory condition.  All questions answered and return precautions discussed.    Patient and or family in agreement and understanding of treatment plan.  All questions answered.      I reviewed the case with the attending ED physician. The attending ED physician agrees with the plan. Patient and/or patient´s representative was counseled regarding labs, imaging, likely diagnosis, and plan. All questions were answered.    ED Medications administered this visit:    Medications   lidocaine 4 % patch 1 patch (1 patch transdermal Not Given 9/16/24 1914)   acetaminophen (Tylenol) tablet 975 mg (975 mg oral Given 9/16/24  1915)   oxyCODONE (Roxicodone) immediate release tablet 5 mg (5 mg oral Given 9/16/24 1915)       New Prescriptions from this visit:    New Prescriptions    No medications on file       Follow-up:  Cielo Becerra DO  [id number] [facility] [address] [phone]      As needed        Final Impression:   1. Fall, initial encounter    2. Sprain of left ankle, unspecified ligament, initial encounter          (Please note that portions of this note were completed with a voice recognition program.  Efforts were made to edit the dictations but occasionally words are mis-transcribed.)     José Manuel Freitas MD  Resident  09/16/24 1444

## 2024-09-17 NOTE — DISCHARGE INSTRUCTIONS
Please continue to take your medications as prescribed.  If you become unable to walk, develop the worst headache of your life, unstoppable vomiting, or other worrisome symptoms, return to the ER.

## 2025-05-06 ENCOUNTER — OFFICE VISIT (OUTPATIENT)
Dept: ORTHOPEDIC SURGERY | Facility: CLINIC | Age: 70
End: 2025-05-06
Payer: MEDICARE

## 2025-05-06 DIAGNOSIS — M19.049 CMC ARTHRITIS: ICD-10-CM

## 2025-05-06 DIAGNOSIS — M19.042 DEGENERATIVE ARTHRITIS OF METACARPOPHALANGEAL JOINT OF LEFT THUMB: Primary | ICD-10-CM

## 2025-05-06 PROCEDURE — 99214 OFFICE O/P EST MOD 30 MIN: CPT | Performed by: ORTHOPAEDIC SURGERY

## 2025-05-06 PROCEDURE — 20600 DRAIN/INJ JOINT/BURSA W/O US: CPT | Mod: LT | Performed by: ORTHOPAEDIC SURGERY

## 2025-05-06 PROCEDURE — L3924 HFO WITHOUT JOINTS PRE OTS: HCPCS | Performed by: ORTHOPAEDIC SURGERY

## 2025-05-06 PROCEDURE — 2500000004 HC RX 250 GENERAL PHARMACY W/ HCPCS (ALT 636 FOR OP/ED): Performed by: ORTHOPAEDIC SURGERY

## 2025-05-06 RX ORDER — LIDOCAINE HYDROCHLORIDE 10 MG/ML
0.5 INJECTION, SOLUTION INFILTRATION; PERINEURAL
Status: COMPLETED | OUTPATIENT
Start: 2025-05-06 | End: 2025-05-06

## 2025-05-06 RX ADMIN — TRIAMCINOLONE ACETONIDE 5 MG: 10 INJECTION, SUSPENSION INTRA-ARTICULAR; INTRALESIONAL at 15:37

## 2025-05-06 RX ADMIN — LIDOCAINE HYDROCHLORIDE 0.5 ML: 10 INJECTION, SOLUTION INFILTRATION; PERINEURAL at 15:37

## 2025-05-06 NOTE — PROGRESS NOTES
5/6/2025    Chief Complaint   Patient presents with    Left Thumb - New Patient Visit, Pain       History of Present Illness:  Patient Hayley Quezada , 69 y.o. female, presents today, 5/6/2025, for evaluation of left thumb pain.  We discussed this about 3 years ago in 2022 when she was treated for distal radius fracture and subsequent hardware removal on the right side.  At that time we gave her a cool comfort brace and she was managing with this until lately symptoms of pain and discomfort have worsened.  No new injury or trauma to report.  She has increasing difficulty with pinching and gripping activities.  She feels this is likely related to arthritic change and she is inquiring about further options.  She is a right-hand-dominant individual.       Review of Systems:   GENERAL: Negative  GI: Negative  MUSCULOSKELETAL: See HPI  SKIN: Negative  NEURO:  Negative     Physical Exam:  GENERAL:  Alert and oriented to person, place, and time.  No acute distress and breathing comfortably; pleasant and cooperative with the examination.  HEENT:  Head is normocephalic and atraumatic.  NECK:  Supple, no visible swelling.  CARDIOVASCULAR:  No palpable tachycardia.  LUNGS:  No audible wheezing or labored breathing.  ABDOMEN:  Nondistended.  Extremities: Evaluation of left upper extremity finds the patient to have a palpable radial artery at the wrist with brisk capillary refill to all digits. The patient has intact sensorium to axillary, radial, median and ulnar nerves. There are no open wounds. There are no signs of infection. There is no evidence of lymphedema or lymphatic streaking. The patient has supple compartments of the left arm, forearm and hand.  She localizes pain discretely with tenderness palpation over the left thumb CMC and MCP articulations.  No tenderness palpation over the A1 pulley no mechanical triggering noted.  She has positive basilar grind test.     Imaging/Test Results:  None today.      Assessment:  Left thumb CMC and MCP arthritic flare.     Plan:  Operative and nonoperative treatment strategies were discussed.  Recommendations were made for initial nonoperative management through Kenalog injection into the CMC joint and MCP joint.  This was performed in office today by Dr. Kimbrough and tolerated by patient.  We will give them a brace to wear for comfort and for activities as needed.  Follow-up again in 6 weeks for repeat clinical exam.  X-ray, 3 views of the left thumb upon return.  Continue with all activities as tolerated.  All questions answered at today's visit.    Hand / UE Inj/Asp: L thumb CMC for osteoarthritis on 5/6/2025 3:37 PM  Indications: pain  Details: 25 G needle, dorsal approach  Medications: 5 mg triamcinolone acetonide 10 mg/mL; 0.5 mL lidocaine 10 mg/mL (1 %)  Outcome: tolerated well, no immediate complications    Left Thumb Carpometacarpal Joint Injection: It was explained to the patient that the risks of a steroid injection include but are not limited to infection, local skin irritation, skin atrophy, calcification, continued pain and discomfort, elevated blood sugar, burning, failure to relieve pain, and possible late infection. The patient verbalized good insight and verbalized consent for the injection. It was further explained that the postinjection discomfort can be alleviated with additional medications, ice, elevation, and rest over the first 24 hours, and that these modalities are recommended.    Using aseptic technique, a solution containing 0.5 cc of 5 mg of Kenalog and 0.5 mL of 1% lidocaine without epinephrine was injected intraarticularly to the left thumb carpometacarpal joint. Digital palpation was used to localize the CMC articulation about the dorsal radial aspect of the joint. Gentle traction was then imparted to the thumb distally and a 25-gauge needle was advanced through the skin, subcutaneous tissue and capsule. The injection was then administered and  the patient tolerated the injection well. A band-aid was then placed. It should be noted that ethyl chloride spray was used to make the injection delivery more comfortable for the patient.  Procedure, treatment alternatives, risks and benefits explained, specific risks discussed. Consent was given by the patient. Immediately prior to procedure a time out was called to verify the correct patient, procedure, equipment, support staff and site/side marked as required. Patient was prepped and draped in the usual sterile fashion.       Hand / UE Inj/Asp: L thumb MCP for osteoarthritis on 5/6/2025 3:37 PM  Indications: pain  Details: 25 G needle, dorsal approach  Medications: 5 mg triamcinolone acetonide 10 mg/mL; 0.5 mL lidocaine 10 mg/mL (1 %)  Outcome: tolerated well, no immediate complications  Procedure, treatment alternatives, risks and benefits explained, specific risks discussed. Consent was given by the patient. Immediately prior to procedure a time out was called to verify the correct patient, procedure, equipment, support staff and site/side marked as required. Patient was prepped and draped in the usual sterile fashion.         In a face to face encounter, I performed a history and physical examination, discussed pertinent diagnostic studies if indicated, and discussed diagnosis and management strategies with both the patient and the mid-level provider. I reviewed the mid-level's note and agree with the documented findings and plan of care.  Patient presents today for evaluation of the left thumb.  She localizes pain to left thumb CMC and MCP.  Focal tenderness to these joints on today's exam.  Treatment options were discussed.  Recommendations were made for trial of steroid injection.  Patient is agreeable.  Steroid injection was performed to the left thumb CMC and left thumb MCP.  New splint was given.  Follow-up in 6 weeks.  X-rays left thumb upon return to office.

## 2025-06-17 ENCOUNTER — APPOINTMENT (OUTPATIENT)
Dept: ORTHOPEDIC SURGERY | Facility: CLINIC | Age: 70
End: 2025-06-17
Payer: MEDICARE

## 2025-07-03 ENCOUNTER — APPOINTMENT (OUTPATIENT)
Dept: ORTHOPEDIC SURGERY | Facility: CLINIC | Age: 70
End: 2025-07-03
Payer: MEDICARE

## (undated) DEVICE — SUTURE VCRL SZ 3-0 L27IN ABSRB UD L26MM SH 1/2 CIR J416H

## (undated) DEVICE — MARKER SURG SKIN GENTIAN VLT REG TIP W/ 6IN RUL

## (undated) DEVICE — BLADE ES ELASTOMERIC COAT INSUL DURABLE BEND UPTO 90DEG

## (undated) DEVICE — LABEL MED MINI W/ MARKER

## (undated) DEVICE — 3M™ IOBAN™ 2 ANTIMICROBIAL INCISE DRAPE 6650EZ: Brand: IOBAN™ 2

## (undated) DEVICE — INTENDED FOR TISSUE SEPARATION, AND OTHER PROCEDURES THAT REQUIRE A SHARP SURGICAL BLADE TO PUNCTURE OR CUT.: Brand: BARD-PARKER ® CARBON RIB-BACK BLADES

## (undated) DEVICE — MEDI-VAC NON-CONDUCTIVE SUCTION TUBING: Brand: CARDINAL HEALTH

## (undated) DEVICE — STERILE LATEX POWDER-FREE SURGICAL GLOVESWITH NITRILE COATING: Brand: PROTEXIS

## (undated) DEVICE — Z DISCONTINUED USE 2272117 DRAPE SURG 3 QTR N INVASIVE 2 LAYR DISP

## (undated) DEVICE — 1842 FOAM BLOCK NEEDLE COUNTER: Brand: DEVON

## (undated) DEVICE — SUTURE VCRL + SZ 4-0 L18IN ABSRB UD L19MM PS-2 3/8 CIR PRIM VCP496H

## (undated) DEVICE — MEDI-VAC YANKAUER SUCTION HANDLE W/BULBOUS TIP: Brand: CARDINAL HEALTH

## (undated) DEVICE — 1010 S-DRAPE TOWEL DRAPE 10/BX: Brand: STERI-DRAPE™

## (undated) DEVICE — SUTURE PERMA-HAND SZ 2-0 L30IN NONABSORBABLE BLK L26MM SH K833H

## (undated) DEVICE — DRAPE SURG C-ARM MOBILE XRAY LF

## (undated) DEVICE — SUTURE VCRL + SZ 3-0 L36IN ABSRB UD L36MM CT-1 1/2 CIR VCP944H

## (undated) DEVICE — SYRINGE BLB 50CC IRRIG PLIABLE FNGR FLNG GRAD FLSK DISP

## (undated) DEVICE — ELECTRODE PT RET AD L9FT HI MOIST COND ADH HYDRGEL CORDED

## (undated) DEVICE — PACK,LAPAROTOMY,NO GOWNS: Brand: MEDLINE

## (undated) DEVICE — NEPTUNE E-SEP SMOKE EVACUATION PENCIL, COATED, 70MM BLADE, PUSH BUTTON SWITCH: Brand: NEPTUNE E-SEP

## (undated) DEVICE — GOWN,AURORA,NONREINFORCED,LARGE: Brand: MEDLINE

## (undated) DEVICE — SUTURE VCRL SZ 3-0 L36IN ABSRB UD L36MM CT-1 1/2 CIR J944H

## (undated) DEVICE — SUTURE VCRL SZ 4-0 L18IN ABSRB UD L19MM PS-2 3/8 CIR PRIM J496H

## (undated) DEVICE — CHLORAPREP 26ML ORANGE

## (undated) DEVICE — SHEET,DRAPE,53X77,STERILE: Brand: MEDLINE

## (undated) DEVICE — SPONGE GZ W4XL4IN RAYON POLY FILL CVR W/ NONWOVEN FAB

## (undated) DEVICE — COVER LT HNDL BLU PLAS

## (undated) DEVICE — TOWEL,OR,DSP,ST,BLUE,STD,4/PK,20PK/CS: Brand: MEDLINE

## (undated) DEVICE — 3M™ TEGADERM™ TRANSPARENT FILM DRESSING FRAME STYLE, 1626W, 4 IN X 4-3/4 IN (10 CM X 12 CM), 50/CT 4CT/CASE: Brand: 3M™ TEGADERM™

## (undated) DEVICE — CABLE TRL MULTILEAD FOR NEUROSTIMULATOR

## (undated) DEVICE — DRAPE CAM W7INXL96MM CVR SURG EQUIP LSR ARM UNIV VID CAM

## (undated) DEVICE — SKIN MARKER,REGULAR TIP WITH RULER: Brand: DEVON

## (undated) DEVICE — STIMULATOR NERVE PT CTRL PROCLAIM

## (undated) DEVICE — WRENCH TORQUE

## (undated) DEVICE — PENCIL ES L3M BTTN SWCH HOLSTER W/ BLDE ELECTRD EDGE

## (undated) DEVICE — GAUZE,SPONGE,4"X4",16PLY,XRAY,STRL,LF: Brand: MEDLINE

## (undated) DEVICE — GLOVE ORANGE PI 7 1/2   MSG9075

## (undated) DEVICE — GAUZE,SPONGE,4"X4",12PLY,STERILE,LF,2'S: Brand: MEDLINE

## (undated) DEVICE — HYPODERMIC SAFETY NEEDLE: Brand: MAGELLAN

## (undated) DEVICE — COUNTER NDL 40 COUNT HLD 70 FOAM BLK ADH W/ MAG

## (undated) DEVICE — C-ARM: Brand: UNBRANDED

## (undated) DEVICE — ADHESIVE SKIN CLSR 0.7ML TOP DERMBND ADV

## (undated) DEVICE — APPLICATOR MEDICATED 26 CC SOLUTION HI LT ORNG CHLORAPREP

## (undated) DEVICE — NEEDLE HYPO 18GA L1.5IN THN WALL PIVOTING SHLD BVL ORIENTED

## (undated) DEVICE — 2000CC GUARDIAN II: Brand: GUARDIAN

## (undated) DEVICE — SUTURE PERMAHAND SZ 2-0 L30IN NONABSORBABLE BLK SILK W/O A305H

## (undated) DEVICE — NEEDLE HYPO 25GA L1.5IN BLU POLYPR HUB S STL REG BVL STR

## (undated) DEVICE — X-RAY DETECTABLE SPONGES,16 PLY: Brand: VISTEC

## (undated) DEVICE — SYRINGE MED 10ML LUERLOCK TIP W/O SFTY DISP